# Patient Record
Sex: FEMALE | Race: WHITE | Employment: FULL TIME | URBAN - METROPOLITAN AREA
[De-identification: names, ages, dates, MRNs, and addresses within clinical notes are randomized per-mention and may not be internally consistent; named-entity substitution may affect disease eponyms.]

---

## 2019-11-26 ENCOUNTER — HOSPITAL ENCOUNTER (INPATIENT)
Age: 39
LOS: 4 days | Discharge: HOME | DRG: 270 | End: 2019-11-30
Attending: EMERGENCY MEDICINE | Admitting: INTERNAL MEDICINE
Payer: COMMERCIAL

## 2019-11-26 ENCOUNTER — APPOINTMENT (OUTPATIENT)
Dept: GENERAL RADIOLOGY | Age: 39
DRG: 270 | End: 2019-11-26
Payer: COMMERCIAL

## 2019-11-26 DIAGNOSIS — E87.6 HYPOKALEMIA: ICD-10-CM

## 2019-11-26 DIAGNOSIS — R77.8 ELEVATED TROPONIN: ICD-10-CM

## 2019-11-26 DIAGNOSIS — I21.3 ACUTE ST ELEVATION MYOCARDIAL INFARCTION (STEMI), UNSPECIFIED ARTERY (HCC): Primary | ICD-10-CM

## 2019-11-26 DIAGNOSIS — E83.42 HYPOMAGNESEMIA: ICD-10-CM

## 2019-11-26 LAB
A/G RATIO: 1.4 (ref 1.1–2.2)
ALBUMIN SERPL-MCNC: 4.1 G/DL (ref 3.4–5)
ALP BLD-CCNC: 68 U/L (ref 40–129)
ALT SERPL-CCNC: 8 U/L (ref 10–40)
ANION GAP SERPL CALCULATED.3IONS-SCNC: 16 MMOL/L (ref 3–16)
AST SERPL-CCNC: 11 U/L (ref 15–37)
BASOPHILS ABSOLUTE: 0.1 K/UL (ref 0–0.2)
BASOPHILS RELATIVE PERCENT: 0.8 %
BILIRUB SERPL-MCNC: <0.2 MG/DL (ref 0–1)
BUN BLDV-MCNC: 13 MG/DL (ref 7–20)
CALCIUM SERPL-MCNC: 8.8 MG/DL (ref 8.3–10.6)
CHLORIDE BLD-SCNC: 100 MMOL/L (ref 99–110)
CO2: 22 MMOL/L (ref 21–32)
CREAT SERPL-MCNC: 0.6 MG/DL (ref 0.6–1.1)
EOSINOPHILS ABSOLUTE: 0.1 K/UL (ref 0–0.6)
EOSINOPHILS RELATIVE PERCENT: 0.8 %
GFR AFRICAN AMERICAN: >60
GFR NON-AFRICAN AMERICAN: >60
GLOBULIN: 3 G/DL
GLUCOSE BLD-MCNC: 135 MG/DL (ref 70–99)
HCG QUALITATIVE: NEGATIVE
HCT VFR BLD CALC: 40.7 % (ref 36–48)
HEMOGLOBIN: 14 G/DL (ref 12–16)
INR BLD: 0.91 (ref 0.86–1.14)
LYMPHOCYTES ABSOLUTE: 4.3 K/UL (ref 1–5.1)
LYMPHOCYTES RELATIVE PERCENT: 39.2 %
MAGNESIUM: 1.7 MG/DL (ref 1.8–2.4)
MCH RBC QN AUTO: 31.3 PG (ref 26–34)
MCHC RBC AUTO-ENTMCNC: 34.4 G/DL (ref 31–36)
MCV RBC AUTO: 90.9 FL (ref 80–100)
MONOCYTES ABSOLUTE: 0.6 K/UL (ref 0–1.3)
MONOCYTES RELATIVE PERCENT: 5.3 %
NEUTROPHILS ABSOLUTE: 5.9 K/UL (ref 1.7–7.7)
NEUTROPHILS RELATIVE PERCENT: 53.9 %
PDW BLD-RTO: 13.1 % (ref 12.4–15.4)
PLATELET # BLD: 323 K/UL (ref 135–450)
PMV BLD AUTO: 7.9 FL (ref 5–10.5)
POTASSIUM REFLEX MAGNESIUM: 3.4 MMOL/L (ref 3.5–5.1)
PROTHROMBIN TIME: 10.6 SEC (ref 10–13.2)
RBC # BLD: 4.48 M/UL (ref 4–5.2)
SODIUM BLD-SCNC: 138 MMOL/L (ref 136–145)
SPECIMEN STATUS: NORMAL
TOTAL PROTEIN: 7.1 G/DL (ref 6.4–8.2)
TROPONIN: 0.02 NG/ML
WBC # BLD: 10.9 K/UL (ref 4–11)

## 2019-11-26 PROCEDURE — C1887 CATHETER, GUIDING: HCPCS

## 2019-11-26 PROCEDURE — 99292 CRITICAL CARE ADDL 30 MIN: CPT | Performed by: INTERNAL MEDICINE

## 2019-11-26 PROCEDURE — 84484 ASSAY OF TROPONIN QUANT: CPT

## 2019-11-26 PROCEDURE — C1725 CATH, TRANSLUMIN NON-LASER: HCPCS

## 2019-11-26 PROCEDURE — 2500000003 HC RX 250 WO HCPCS

## 2019-11-26 PROCEDURE — 94761 N-INVAS EAR/PLS OXIMETRY MLT: CPT

## 2019-11-26 PROCEDURE — 93458 L HRT ARTERY/VENTRICLE ANGIO: CPT | Performed by: INTERNAL MEDICINE

## 2019-11-26 PROCEDURE — B2111ZZ FLUOROSCOPY OF MULTIPLE CORONARY ARTERIES USING LOW OSMOLAR CONTRAST: ICD-10-PCS | Performed by: INTERNAL MEDICINE

## 2019-11-26 PROCEDURE — 99291 CRITICAL CARE FIRST HOUR: CPT

## 2019-11-26 PROCEDURE — B2151ZZ FLUOROSCOPY OF LEFT HEART USING LOW OSMOLAR CONTRAST: ICD-10-PCS | Performed by: INTERNAL MEDICINE

## 2019-11-26 PROCEDURE — 71045 X-RAY EXAM CHEST 1 VIEW: CPT

## 2019-11-26 PROCEDURE — 2700000000 HC OXYGEN THERAPY PER DAY

## 2019-11-26 PROCEDURE — 83735 ASSAY OF MAGNESIUM: CPT

## 2019-11-26 PROCEDURE — 2709999900 HC NON-CHARGEABLE SUPPLY

## 2019-11-26 PROCEDURE — 6360000002 HC RX W HCPCS

## 2019-11-26 PROCEDURE — 33967 INSERT I-AORT PERCUT DEVICE: CPT | Performed by: INTERNAL MEDICINE

## 2019-11-26 PROCEDURE — 93005 ELECTROCARDIOGRAM TRACING: CPT | Performed by: EMERGENCY MEDICINE

## 2019-11-26 PROCEDURE — 96374 THER/PROPH/DIAG INJ IV PUSH: CPT

## 2019-11-26 PROCEDURE — 85347 COAGULATION TIME ACTIVATED: CPT

## 2019-11-26 PROCEDURE — 6370000000 HC RX 637 (ALT 250 FOR IP): Performed by: EMERGENCY MEDICINE

## 2019-11-26 PROCEDURE — C1894 INTRO/SHEATH, NON-LASER: HCPCS

## 2019-11-26 PROCEDURE — 85025 COMPLETE CBC W/AUTO DIFF WBC: CPT

## 2019-11-26 PROCEDURE — 2000000000 HC ICU R&B

## 2019-11-26 PROCEDURE — C1769 GUIDE WIRE: HCPCS

## 2019-11-26 PROCEDURE — 80053 COMPREHEN METABOLIC PANEL: CPT

## 2019-11-26 PROCEDURE — 2580000003 HC RX 258

## 2019-11-26 PROCEDURE — 96375 TX/PRO/DX INJ NEW DRUG ADDON: CPT

## 2019-11-26 PROCEDURE — 6360000002 HC RX W HCPCS: Performed by: EMERGENCY MEDICINE

## 2019-11-26 PROCEDURE — 85610 PROTHROMBIN TIME: CPT

## 2019-11-26 PROCEDURE — 5A02110 ASSISTANCE WITH CARDIAC OUTPUT USING BALLOON PUMP, INTERMITTENT: ICD-10-PCS | Performed by: INTERNAL MEDICINE

## 2019-11-26 PROCEDURE — 99291 CRITICAL CARE FIRST HOUR: CPT | Performed by: INTERNAL MEDICINE

## 2019-11-26 PROCEDURE — 84703 CHORIONIC GONADOTROPIN ASSAY: CPT

## 2019-11-26 PROCEDURE — 6360000004 HC RX CONTRAST MEDICATION

## 2019-11-26 PROCEDURE — 4A023N7 MEASUREMENT OF CARDIAC SAMPLING AND PRESSURE, LEFT HEART, PERCUTANEOUS APPROACH: ICD-10-PCS | Performed by: INTERNAL MEDICINE

## 2019-11-26 RX ORDER — HEPARIN SODIUM 1000 [USP'U]/ML
60 INJECTION, SOLUTION INTRAVENOUS; SUBCUTANEOUS PRN
Status: DISCONTINUED | OUTPATIENT
Start: 2019-11-26 | End: 2019-11-27 | Stop reason: ALTCHOICE

## 2019-11-26 RX ORDER — ONDANSETRON 2 MG/ML
4 INJECTION INTRAMUSCULAR; INTRAVENOUS EVERY 6 HOURS PRN
Status: DISCONTINUED | OUTPATIENT
Start: 2019-11-26 | End: 2019-11-30 | Stop reason: HOSPADM

## 2019-11-26 RX ORDER — HEPARIN SODIUM 1000 [USP'U]/ML
30 INJECTION, SOLUTION INTRAVENOUS; SUBCUTANEOUS PRN
Status: DISCONTINUED | OUTPATIENT
Start: 2019-11-26 | End: 2019-11-27 | Stop reason: ALTCHOICE

## 2019-11-26 RX ORDER — FENTANYL CITRATE 50 UG/ML
INJECTION, SOLUTION INTRAMUSCULAR; INTRAVENOUS
Status: COMPLETED | OUTPATIENT
Start: 2019-11-26 | End: 2019-11-26

## 2019-11-26 RX ORDER — HEPARIN SODIUM 1000 [USP'U]/ML
INJECTION, SOLUTION INTRAVENOUS; SUBCUTANEOUS
Status: COMPLETED
Start: 2019-11-26 | End: 2019-11-26

## 2019-11-26 RX ORDER — HEPARIN SODIUM 1000 [USP'U]/ML
60 INJECTION, SOLUTION INTRAVENOUS; SUBCUTANEOUS ONCE
Status: COMPLETED | OUTPATIENT
Start: 2019-11-26 | End: 2019-11-26

## 2019-11-26 RX ORDER — ATORVASTATIN CALCIUM 40 MG/1
40 TABLET, FILM COATED ORAL NIGHTLY
Status: DISCONTINUED | OUTPATIENT
Start: 2019-11-27 | End: 2019-11-30 | Stop reason: HOSPADM

## 2019-11-26 RX ORDER — ASPIRIN 81 MG/1
81 TABLET, CHEWABLE ORAL DAILY
Status: DISCONTINUED | OUTPATIENT
Start: 2019-11-27 | End: 2019-11-30 | Stop reason: HOSPADM

## 2019-11-26 RX ORDER — HEPARIN SODIUM 1000 [USP'U]/ML
60 INJECTION, SOLUTION INTRAVENOUS; SUBCUTANEOUS ONCE
Status: DISCONTINUED | OUTPATIENT
Start: 2019-11-27 | End: 2019-11-27 | Stop reason: SDUPTHER

## 2019-11-26 RX ORDER — FENTANYL CITRATE 50 UG/ML
25 INJECTION, SOLUTION INTRAMUSCULAR; INTRAVENOUS ONCE
Status: COMPLETED | OUTPATIENT
Start: 2019-11-26 | End: 2019-11-26

## 2019-11-26 RX ORDER — MAGNESIUM SULFATE 1 G/100ML
1 INJECTION INTRAVENOUS ONCE
Status: COMPLETED | OUTPATIENT
Start: 2019-11-26 | End: 2019-11-27

## 2019-11-26 RX ORDER — MIDAZOLAM HYDROCHLORIDE 5 MG/ML
INJECTION INTRAMUSCULAR; INTRAVENOUS
Status: COMPLETED | OUTPATIENT
Start: 2019-11-26 | End: 2019-11-26

## 2019-11-26 RX ORDER — HEPARIN SODIUM 10000 [USP'U]/100ML
12 INJECTION, SOLUTION INTRAVENOUS CONTINUOUS
Status: DISCONTINUED | OUTPATIENT
Start: 2019-11-27 | End: 2019-11-27

## 2019-11-26 RX ORDER — NITROGLYCERIN 0.4 MG/1
0.4 TABLET SUBLINGUAL EVERY 5 MIN PRN
Status: DISCONTINUED | OUTPATIENT
Start: 2019-11-26 | End: 2019-11-27 | Stop reason: SDUPTHER

## 2019-11-26 RX ORDER — HEPARIN SODIUM 1000 [USP'U]/ML
INJECTION, SOLUTION INTRAVENOUS; SUBCUTANEOUS
Status: COMPLETED | OUTPATIENT
Start: 2019-11-26 | End: 2019-11-26

## 2019-11-26 RX ADMIN — FENTANYL CITRATE 25 MCG: 50 INJECTION, SOLUTION INTRAMUSCULAR; INTRAVENOUS at 22:41

## 2019-11-26 RX ADMIN — FENTANYL CITRATE 50 MCG: 50 INJECTION, SOLUTION INTRAMUSCULAR; INTRAVENOUS at 23:30

## 2019-11-26 RX ADMIN — TICAGRELOR 180 MG: 90 TABLET ORAL at 22:29

## 2019-11-26 RX ADMIN — HEPARIN SODIUM 3000 UNITS: 1000 INJECTION, SOLUTION INTRAVENOUS; SUBCUTANEOUS at 23:11

## 2019-11-26 RX ADMIN — FENTANYL CITRATE 50 MCG: 50 INJECTION, SOLUTION INTRAMUSCULAR; INTRAVENOUS at 23:00

## 2019-11-26 RX ADMIN — HEPARIN SODIUM 3810 UNITS: 1000 INJECTION INTRAVENOUS; SUBCUTANEOUS at 22:33

## 2019-11-26 RX ADMIN — MIDAZOLAM HYDROCHLORIDE 2 MG: 5 INJECTION INTRAMUSCULAR; INTRAVENOUS at 23:00

## 2019-11-26 RX ADMIN — HEPARIN SODIUM 3810 UNITS: 1000 INJECTION, SOLUTION INTRAVENOUS; SUBCUTANEOUS at 22:33

## 2019-11-26 ASSESSMENT — PAIN SCALES - GENERAL
PAINLEVEL_OUTOF10: 3
PAINLEVEL_OUTOF10: 3

## 2019-11-26 ASSESSMENT — PAIN DESCRIPTION - PAIN TYPE: TYPE: ACUTE PAIN

## 2019-11-26 ASSESSMENT — PAIN DESCRIPTION - LOCATION: LOCATION: CHEST

## 2019-11-27 ENCOUNTER — APPOINTMENT (OUTPATIENT)
Dept: GENERAL RADIOLOGY | Age: 39
DRG: 270 | End: 2019-11-27
Payer: COMMERCIAL

## 2019-11-27 PROBLEM — I21.3 STEMI (ST ELEVATION MYOCARDIAL INFARCTION) (HCC): Status: RESOLVED | Noted: 2019-11-27 | Resolved: 2019-11-27

## 2019-11-27 PROBLEM — I21.3 STEMI (ST ELEVATION MYOCARDIAL INFARCTION) (HCC): Status: ACTIVE | Noted: 2019-11-27

## 2019-11-27 LAB
A/G RATIO: 1.1 (ref 1.1–2.2)
A/G RATIO: 1.2 (ref 1.1–2.2)
ALBUMIN SERPL-MCNC: 3.5 G/DL (ref 3.4–5)
ALBUMIN SERPL-MCNC: 3.7 G/DL (ref 3.4–5)
ALP BLD-CCNC: 64 U/L (ref 40–129)
ALP BLD-CCNC: 65 U/L (ref 40–129)
ALT SERPL-CCNC: 20 U/L (ref 10–40)
ALT SERPL-CCNC: 24 U/L (ref 10–40)
ANION GAP SERPL CALCULATED.3IONS-SCNC: 11 MMOL/L (ref 3–16)
ANION GAP SERPL CALCULATED.3IONS-SCNC: 11 MMOL/L (ref 3–16)
APTT: 39.8 SEC (ref 24.2–36.2)
APTT: 41.1 SEC (ref 24.2–36.2)
AST SERPL-CCNC: 143 U/L (ref 15–37)
AST SERPL-CCNC: 182 U/L (ref 15–37)
BASOPHILS ABSOLUTE: 0 K/UL (ref 0–0.2)
BASOPHILS RELATIVE PERCENT: 0.6 %
BILIRUB SERPL-MCNC: 0.3 MG/DL (ref 0–1)
BILIRUB SERPL-MCNC: 0.3 MG/DL (ref 0–1)
BUN BLDV-MCNC: 10 MG/DL (ref 7–20)
BUN BLDV-MCNC: 11 MG/DL (ref 7–20)
C-REACTIVE PROTEIN: 14 MG/L (ref 0–5.1)
CALCIUM SERPL-MCNC: 8.3 MG/DL (ref 8.3–10.6)
CALCIUM SERPL-MCNC: 8.3 MG/DL (ref 8.3–10.6)
CHLORIDE BLD-SCNC: 103 MMOL/L (ref 99–110)
CHLORIDE BLD-SCNC: 104 MMOL/L (ref 99–110)
CHOLESTEROL, TOTAL: 159 MG/DL (ref 0–199)
CO2: 20 MMOL/L (ref 21–32)
CO2: 20 MMOL/L (ref 21–32)
CREAT SERPL-MCNC: <0.5 MG/DL (ref 0.6–1.1)
CREAT SERPL-MCNC: <0.5 MG/DL (ref 0.6–1.1)
EKG ATRIAL RATE: 73 BPM
EKG ATRIAL RATE: 80 BPM
EKG DIAGNOSIS: NORMAL
EKG DIAGNOSIS: NORMAL
EKG P AXIS: -8 DEGREES
EKG P AXIS: 69 DEGREES
EKG P-R INTERVAL: 152 MS
EKG P-R INTERVAL: 160 MS
EKG Q-T INTERVAL: 382 MS
EKG Q-T INTERVAL: 392 MS
EKG QRS DURATION: 88 MS
EKG QRS DURATION: 90 MS
EKG QTC CALCULATION (BAZETT): 431 MS
EKG QTC CALCULATION (BAZETT): 440 MS
EKG R AXIS: 0 DEGREES
EKG R AXIS: 6 DEGREES
EKG T AXIS: 55 DEGREES
EKG T AXIS: 72 DEGREES
EKG VENTRICULAR RATE: 73 BPM
EKG VENTRICULAR RATE: 80 BPM
EOSINOPHILS ABSOLUTE: 0 K/UL (ref 0–0.6)
EOSINOPHILS RELATIVE PERCENT: 0.1 %
GFR AFRICAN AMERICAN: >60
GFR AFRICAN AMERICAN: >60
GFR NON-AFRICAN AMERICAN: >60
GFR NON-AFRICAN AMERICAN: >60
GLOBULIN: 3 G/DL
GLOBULIN: 3.1 G/DL
GLUCOSE BLD-MCNC: 102 MG/DL (ref 70–99)
GLUCOSE BLD-MCNC: 143 MG/DL (ref 70–99)
HCT VFR BLD CALC: 38 % (ref 36–48)
HCT VFR BLD CALC: 38.1 % (ref 36–48)
HDLC SERPL-MCNC: 50 MG/DL (ref 40–60)
HEMOGLOBIN: 12.9 G/DL (ref 12–16)
HEMOGLOBIN: 13 G/DL (ref 12–16)
LDL CHOLESTEROL CALCULATED: 94 MG/DL
LV EF: 38 %
LVEF MODALITY: NORMAL
LYMPHOCYTES ABSOLUTE: 1.1 K/UL (ref 1–5.1)
LYMPHOCYTES RELATIVE PERCENT: 13.7 %
MCH RBC QN AUTO: 31.1 PG (ref 26–34)
MCH RBC QN AUTO: 31.3 PG (ref 26–34)
MCHC RBC AUTO-ENTMCNC: 34.1 G/DL (ref 31–36)
MCHC RBC AUTO-ENTMCNC: 34.1 G/DL (ref 31–36)
MCV RBC AUTO: 91 FL (ref 80–100)
MCV RBC AUTO: 91.8 FL (ref 80–100)
MONOCYTES ABSOLUTE: 0.5 K/UL (ref 0–1.3)
MONOCYTES RELATIVE PERCENT: 6.5 %
NEUTROPHILS ABSOLUTE: 6.4 K/UL (ref 1.7–7.7)
NEUTROPHILS RELATIVE PERCENT: 79.1 %
PDW BLD-RTO: 13 % (ref 12.4–15.4)
PDW BLD-RTO: 13.2 % (ref 12.4–15.4)
PLATELET # BLD: 281 K/UL (ref 135–450)
PLATELET # BLD: 281 K/UL (ref 135–450)
PMV BLD AUTO: 8 FL (ref 5–10.5)
PMV BLD AUTO: 8.1 FL (ref 5–10.5)
POC ACT LR: 156 SEC
POC ACT LR: 353 SEC
POTASSIUM REFLEX MAGNESIUM: 3.8 MMOL/L (ref 3.5–5.1)
POTASSIUM REFLEX MAGNESIUM: 3.9 MMOL/L (ref 3.5–5.1)
RBC # BLD: 4.14 M/UL (ref 4–5.2)
RBC # BLD: 4.19 M/UL (ref 4–5.2)
REASON FOR REJECTION: NORMAL
REJECTED TEST: NORMAL
SEDIMENTATION RATE, ERYTHROCYTE: 17 MM/HR (ref 0–20)
SODIUM BLD-SCNC: 134 MMOL/L (ref 136–145)
SODIUM BLD-SCNC: 135 MMOL/L (ref 136–145)
TOTAL PROTEIN: 6.6 G/DL (ref 6.4–8.2)
TOTAL PROTEIN: 6.7 G/DL (ref 6.4–8.2)
TRIGL SERPL-MCNC: 76 MG/DL (ref 0–150)
TROPONIN: 4.41 NG/ML
TROPONIN: 5.1 NG/ML
VLDLC SERPL CALC-MCNC: 15 MG/DL
WBC # BLD: 10.6 K/UL (ref 4–11)
WBC # BLD: 8.1 K/UL (ref 4–11)

## 2019-11-27 PROCEDURE — C8929 TTE W OR WO FOL WCON,DOPPLER: HCPCS

## 2019-11-27 PROCEDURE — 85652 RBC SED RATE AUTOMATED: CPT

## 2019-11-27 PROCEDURE — 86255 FLUORESCENT ANTIBODY SCREEN: CPT

## 2019-11-27 PROCEDURE — 6370000000 HC RX 637 (ALT 250 FOR IP)

## 2019-11-27 PROCEDURE — 93010 ELECTROCARDIOGRAM REPORT: CPT | Performed by: INTERNAL MEDICINE

## 2019-11-27 PROCEDURE — 2700000000 HC OXYGEN THERAPY PER DAY

## 2019-11-27 PROCEDURE — 94761 N-INVAS EAR/PLS OXIMETRY MLT: CPT

## 2019-11-27 PROCEDURE — 6360000002 HC RX W HCPCS: Performed by: INTERNAL MEDICINE

## 2019-11-27 PROCEDURE — 80061 LIPID PANEL: CPT

## 2019-11-27 PROCEDURE — 84484 ASSAY OF TROPONIN QUANT: CPT

## 2019-11-27 PROCEDURE — 6370000000 HC RX 637 (ALT 250 FOR IP): Performed by: INTERNAL MEDICINE

## 2019-11-27 PROCEDURE — 93971 EXTREMITY STUDY: CPT | Performed by: INTERNAL MEDICINE

## 2019-11-27 PROCEDURE — 80053 COMPREHEN METABOLIC PANEL: CPT

## 2019-11-27 PROCEDURE — 86140 C-REACTIVE PROTEIN: CPT

## 2019-11-27 PROCEDURE — 71045 X-RAY EXAM CHEST 1 VIEW: CPT

## 2019-11-27 PROCEDURE — 6360000002 HC RX W HCPCS: Performed by: EMERGENCY MEDICINE

## 2019-11-27 PROCEDURE — 36415 COLL VENOUS BLD VENIPUNCTURE: CPT

## 2019-11-27 PROCEDURE — 85027 COMPLETE CBC AUTOMATED: CPT

## 2019-11-27 PROCEDURE — 93458 L HRT ARTERY/VENTRICLE ANGIO: CPT

## 2019-11-27 PROCEDURE — 6360000002 HC RX W HCPCS

## 2019-11-27 PROCEDURE — 93005 ELECTROCARDIOGRAM TRACING: CPT | Performed by: INTERNAL MEDICINE

## 2019-11-27 PROCEDURE — 85347 COAGULATION TIME ACTIVATED: CPT

## 2019-11-27 PROCEDURE — 99222 1ST HOSP IP/OBS MODERATE 55: CPT | Performed by: INTERNAL MEDICINE

## 2019-11-27 PROCEDURE — 2580000003 HC RX 258: Performed by: INTERNAL MEDICINE

## 2019-11-27 PROCEDURE — 33967 INSERT I-AORT PERCUT DEVICE: CPT

## 2019-11-27 PROCEDURE — 99223 1ST HOSP IP/OBS HIGH 75: CPT | Performed by: THORACIC SURGERY (CARDIOTHORACIC VASCULAR SURGERY)

## 2019-11-27 PROCEDURE — 86038 ANTINUCLEAR ANTIBODIES: CPT

## 2019-11-27 PROCEDURE — 2000000000 HC ICU R&B

## 2019-11-27 PROCEDURE — 99291 CRITICAL CARE FIRST HOUR: CPT | Performed by: INTERNAL MEDICINE

## 2019-11-27 PROCEDURE — 85025 COMPLETE CBC W/AUTO DIFF WBC: CPT

## 2019-11-27 PROCEDURE — 2580000003 HC RX 258

## 2019-11-27 PROCEDURE — 85730 THROMBOPLASTIN TIME PARTIAL: CPT

## 2019-11-27 RX ORDER — MIDAZOLAM HYDROCHLORIDE 1 MG/ML
INJECTION INTRAMUSCULAR; INTRAVENOUS
Status: DISPENSED
Start: 2019-11-27 | End: 2019-11-28

## 2019-11-27 RX ORDER — MIDAZOLAM HYDROCHLORIDE 1 MG/ML
2 INJECTION INTRAMUSCULAR; INTRAVENOUS ONCE
Status: COMPLETED | OUTPATIENT
Start: 2019-11-27 | End: 2019-11-27

## 2019-11-27 RX ORDER — SODIUM CHLORIDE 9 MG/ML
INJECTION, SOLUTION INTRAVENOUS
Status: COMPLETED
Start: 2019-11-27 | End: 2019-11-27

## 2019-11-27 RX ORDER — MORPHINE SULFATE 4 MG/ML
4 INJECTION, SOLUTION INTRAMUSCULAR; INTRAVENOUS EVERY 4 HOURS PRN
Status: DISCONTINUED | OUTPATIENT
Start: 2019-11-27 | End: 2019-11-30 | Stop reason: HOSPADM

## 2019-11-27 RX ORDER — ONDANSETRON 2 MG/ML
4 INJECTION INTRAMUSCULAR; INTRAVENOUS EVERY 6 HOURS PRN
Status: DISCONTINUED | OUTPATIENT
Start: 2019-11-27 | End: 2019-11-30 | Stop reason: HOSPADM

## 2019-11-27 RX ORDER — SODIUM CHLORIDE 0.9 % (FLUSH) 0.9 %
10 SYRINGE (ML) INJECTION EVERY 12 HOURS SCHEDULED
Status: DISCONTINUED | OUTPATIENT
Start: 2019-11-27 | End: 2019-11-30 | Stop reason: HOSPADM

## 2019-11-27 RX ORDER — SODIUM CHLORIDE 0.9 % (FLUSH) 0.9 %
10 SYRINGE (ML) INJECTION EVERY 12 HOURS SCHEDULED
Status: DISCONTINUED | OUTPATIENT
Start: 2019-11-27 | End: 2019-11-27 | Stop reason: SDUPTHER

## 2019-11-27 RX ORDER — FAMOTIDINE 20 MG/1
20 TABLET, FILM COATED ORAL 2 TIMES DAILY
Status: DISCONTINUED | OUTPATIENT
Start: 2019-11-27 | End: 2019-11-30 | Stop reason: HOSPADM

## 2019-11-27 RX ORDER — MORPHINE SULFATE 4 MG/ML
2 INJECTION, SOLUTION INTRAMUSCULAR; INTRAVENOUS
Status: COMPLETED | OUTPATIENT
Start: 2019-11-27 | End: 2019-11-27

## 2019-11-27 RX ORDER — ACETAMINOPHEN 325 MG/1
650 TABLET ORAL EVERY 4 HOURS PRN
Status: DISCONTINUED | OUTPATIENT
Start: 2019-11-27 | End: 2019-11-30 | Stop reason: HOSPADM

## 2019-11-27 RX ORDER — FENTANYL CITRATE 50 UG/ML
25 INJECTION, SOLUTION INTRAMUSCULAR; INTRAVENOUS ONCE
Status: COMPLETED | OUTPATIENT
Start: 2019-11-27 | End: 2019-11-27

## 2019-11-27 RX ORDER — HEPARIN SODIUM 10000 [USP'U]/100ML
8.9 INJECTION, SOLUTION INTRAVENOUS CONTINUOUS
Status: DISCONTINUED | OUTPATIENT
Start: 2019-11-27 | End: 2019-11-27 | Stop reason: ALTCHOICE

## 2019-11-27 RX ORDER — SODIUM CHLORIDE 0.9 % (FLUSH) 0.9 %
10 SYRINGE (ML) INJECTION PRN
Status: DISCONTINUED | OUTPATIENT
Start: 2019-11-27 | End: 2019-11-27 | Stop reason: SDUPTHER

## 2019-11-27 RX ORDER — SODIUM CHLORIDE 9 MG/ML
INJECTION, SOLUTION INTRAVENOUS ONCE
Status: COMPLETED | OUTPATIENT
Start: 2019-11-27 | End: 2019-11-27

## 2019-11-27 RX ORDER — LORAZEPAM 2 MG/ML
1 INJECTION INTRAMUSCULAR EVERY 4 HOURS PRN
Status: DISCONTINUED | OUTPATIENT
Start: 2019-11-27 | End: 2019-11-30 | Stop reason: HOSPADM

## 2019-11-27 RX ORDER — FENTANYL CITRATE 50 UG/ML
INJECTION, SOLUTION INTRAMUSCULAR; INTRAVENOUS
Status: COMPLETED
Start: 2019-11-27 | End: 2019-11-27

## 2019-11-27 RX ORDER — MIDAZOLAM HYDROCHLORIDE 5 MG/ML
INJECTION INTRAMUSCULAR; INTRAVENOUS
Status: COMPLETED
Start: 2019-11-27 | End: 2019-11-27

## 2019-11-27 RX ORDER — MIDAZOLAM HYDROCHLORIDE 1 MG/ML
5 INJECTION INTRAMUSCULAR; INTRAVENOUS ONCE
Status: COMPLETED | OUTPATIENT
Start: 2019-11-27 | End: 2019-11-27

## 2019-11-27 RX ORDER — SODIUM CHLORIDE 0.9 % (FLUSH) 0.9 %
10 SYRINGE (ML) INJECTION PRN
Status: DISCONTINUED | OUTPATIENT
Start: 2019-11-27 | End: 2019-11-30 | Stop reason: HOSPADM

## 2019-11-27 RX ADMIN — FENTANYL CITRATE 25 MCG: 50 INJECTION, SOLUTION INTRAMUSCULAR; INTRAVENOUS at 14:28

## 2019-11-27 RX ADMIN — MORPHINE SULFATE 2 MG: 4 INJECTION, SOLUTION INTRAMUSCULAR; INTRAVENOUS at 03:50

## 2019-11-27 RX ADMIN — HEPARIN SODIUM 12 UNITS/KG/HR: 10000 INJECTION, SOLUTION INTRAVENOUS at 01:25

## 2019-11-27 RX ADMIN — MIDAZOLAM HYDROCHLORIDE 2 MG: 5 INJECTION, SOLUTION INTRAMUSCULAR; INTRAVENOUS at 13:51

## 2019-11-27 RX ADMIN — MORPHINE SULFATE 2 MG: 4 INJECTION, SOLUTION INTRAMUSCULAR; INTRAVENOUS at 15:37

## 2019-11-27 RX ADMIN — METOPROLOL TARTRATE 12.5 MG: 25 TABLET, FILM COATED ORAL at 09:02

## 2019-11-27 RX ADMIN — FENTANYL CITRATE 50 MCG: 50 INJECTION, SOLUTION INTRAMUSCULAR; INTRAVENOUS at 13:51

## 2019-11-27 RX ADMIN — ATORVASTATIN CALCIUM 40 MG: 40 TABLET, FILM COATED ORAL at 01:24

## 2019-11-27 RX ADMIN — HEPARIN SODIUM 1910 UNITS: 1000 INJECTION INTRAVENOUS; SUBCUTANEOUS at 08:00

## 2019-11-27 RX ADMIN — SODIUM CHLORIDE 1000 ML: 9 INJECTION, SOLUTION INTRAVENOUS at 10:50

## 2019-11-27 RX ADMIN — MAGNESIUM SULFATE HEPTAHYDRATE 1 G: 1 INJECTION, SOLUTION INTRAVENOUS at 01:37

## 2019-11-27 RX ADMIN — MORPHINE SULFATE 2 MG: 4 INJECTION, SOLUTION INTRAMUSCULAR; INTRAVENOUS at 11:44

## 2019-11-27 RX ADMIN — MIDAZOLAM 2 MG: 1 INJECTION INTRAMUSCULAR; INTRAVENOUS at 13:39

## 2019-11-27 RX ADMIN — MORPHINE SULFATE 2 MG: 4 INJECTION, SOLUTION INTRAMUSCULAR; INTRAVENOUS at 06:26

## 2019-11-27 RX ADMIN — LORAZEPAM 1 MG: 2 INJECTION, SOLUTION INTRAMUSCULAR; INTRAVENOUS at 01:24

## 2019-11-27 RX ADMIN — ASPIRIN 81 MG 81 MG: 81 TABLET ORAL at 09:01

## 2019-11-27 RX ADMIN — Medication 12.5 MG: at 09:02

## 2019-11-27 RX ADMIN — LORAZEPAM 1 MG: 2 INJECTION, SOLUTION INTRAMUSCULAR; INTRAVENOUS at 15:37

## 2019-11-27 RX ADMIN — ATORVASTATIN CALCIUM 40 MG: 40 TABLET, FILM COATED ORAL at 20:57

## 2019-11-27 RX ADMIN — MAGNESIUM SULFATE HEPTAHYDRATE 3 G: 500 INJECTION, SOLUTION INTRAMUSCULAR; INTRAVENOUS at 09:12

## 2019-11-27 RX ADMIN — MORPHINE SULFATE 4 MG: 4 INJECTION, SOLUTION INTRAMUSCULAR; INTRAVENOUS at 21:02

## 2019-11-27 RX ADMIN — FAMOTIDINE 20 MG: 20 TABLET ORAL at 20:57

## 2019-11-27 RX ADMIN — FENTANYL CITRATE 25 MCG: 50 INJECTION INTRAMUSCULAR; INTRAVENOUS at 13:43

## 2019-11-27 RX ADMIN — MIDAZOLAM HYDROCHLORIDE 1 MG: 2 INJECTION, SOLUTION INTRAMUSCULAR; INTRAVENOUS at 14:27

## 2019-11-27 ASSESSMENT — PAIN SCALES - GENERAL
PAINLEVEL_OUTOF10: 9
PAINLEVEL_OUTOF10: 0
PAINLEVEL_OUTOF10: 9
PAINLEVEL_OUTOF10: 6
PAINLEVEL_OUTOF10: 3
PAINLEVEL_OUTOF10: 5
PAINLEVEL_OUTOF10: 9
PAINLEVEL_OUTOF10: 0
PAINLEVEL_OUTOF10: 0

## 2019-11-27 ASSESSMENT — PAIN DESCRIPTION - ONSET
ONSET: ON-GOING
ONSET: ON-GOING

## 2019-11-27 ASSESSMENT — PAIN DESCRIPTION - PROGRESSION
CLINICAL_PROGRESSION: GRADUALLY WORSENING

## 2019-11-27 ASSESSMENT — PAIN DESCRIPTION - LOCATION
LOCATION: BACK
LOCATION: BACK
LOCATION: BACK;GROIN

## 2019-11-27 ASSESSMENT — PAIN DESCRIPTION - DESCRIPTORS
DESCRIPTORS: ACHING;DISCOMFORT
DESCRIPTORS: ACHING
DESCRIPTORS: ACHING;CONSTANT

## 2019-11-27 ASSESSMENT — PAIN DESCRIPTION - PAIN TYPE
TYPE: ACUTE PAIN
TYPE: ACUTE PAIN;SURGICAL PAIN
TYPE: ACUTE PAIN

## 2019-11-27 ASSESSMENT — PAIN DESCRIPTION - FREQUENCY
FREQUENCY: CONTINUOUS
FREQUENCY: CONTINUOUS
FREQUENCY: INTERMITTENT

## 2019-11-27 ASSESSMENT — PAIN DESCRIPTION - ORIENTATION
ORIENTATION: RIGHT;LOWER
ORIENTATION: LOWER;MID
ORIENTATION: LOWER

## 2019-11-28 LAB
A/G RATIO: 1.1 (ref 1.1–2.2)
ALBUMIN SERPL-MCNC: 3.3 G/DL (ref 3.4–5)
ALP BLD-CCNC: 64 U/L (ref 40–129)
ALT SERPL-CCNC: 20 U/L (ref 10–40)
ANION GAP SERPL CALCULATED.3IONS-SCNC: 12 MMOL/L (ref 3–16)
AST SERPL-CCNC: 81 U/L (ref 15–37)
BASOPHILS ABSOLUTE: 0.1 K/UL (ref 0–0.2)
BASOPHILS RELATIVE PERCENT: 0.6 %
BILIRUB SERPL-MCNC: 0.3 MG/DL (ref 0–1)
BUN BLDV-MCNC: 8 MG/DL (ref 7–20)
CALCIUM SERPL-MCNC: 7.9 MG/DL (ref 8.3–10.6)
CHLORIDE BLD-SCNC: 103 MMOL/L (ref 99–110)
CO2: 20 MMOL/L (ref 21–32)
CREAT SERPL-MCNC: <0.5 MG/DL (ref 0.6–1.1)
EKG ATRIAL RATE: 101 BPM
EKG DIAGNOSIS: NORMAL
EKG P AXIS: 53 DEGREES
EKG P-R INTERVAL: 152 MS
EKG Q-T INTERVAL: 364 MS
EKG QRS DURATION: 78 MS
EKG QTC CALCULATION (BAZETT): 471 MS
EKG R AXIS: -9 DEGREES
EKG T AXIS: 38 DEGREES
EKG VENTRICULAR RATE: 101 BPM
EOSINOPHILS ABSOLUTE: 0 K/UL (ref 0–0.6)
EOSINOPHILS RELATIVE PERCENT: 0.4 %
GFR AFRICAN AMERICAN: >60
GFR NON-AFRICAN AMERICAN: >60
GLOBULIN: 3.1 G/DL
GLUCOSE BLD-MCNC: 93 MG/DL (ref 70–99)
HCT VFR BLD CALC: 39.1 % (ref 36–48)
HEMOGLOBIN: 13.3 G/DL (ref 12–16)
LYMPHOCYTES ABSOLUTE: 2.5 K/UL (ref 1–5.1)
LYMPHOCYTES RELATIVE PERCENT: 26.1 %
MCH RBC QN AUTO: 31.5 PG (ref 26–34)
MCHC RBC AUTO-ENTMCNC: 33.9 G/DL (ref 31–36)
MCV RBC AUTO: 92.8 FL (ref 80–100)
MONOCYTES ABSOLUTE: 0.8 K/UL (ref 0–1.3)
MONOCYTES RELATIVE PERCENT: 8.1 %
NEUTROPHILS ABSOLUTE: 6.3 K/UL (ref 1.7–7.7)
NEUTROPHILS RELATIVE PERCENT: 64.8 %
PDW BLD-RTO: 13.1 % (ref 12.4–15.4)
PLATELET # BLD: 250 K/UL (ref 135–450)
PLATELET SLIDE REVIEW: ADEQUATE
PMV BLD AUTO: 7.9 FL (ref 5–10.5)
POC ACT LR: 137 SEC
POTASSIUM SERPL-SCNC: 4.4 MMOL/L (ref 3.5–5.1)
RBC # BLD: 4.21 M/UL (ref 4–5.2)
SLIDE REVIEW: NORMAL
SODIUM BLD-SCNC: 135 MMOL/L (ref 136–145)
TOTAL PROTEIN: 6.4 G/DL (ref 6.4–8.2)
WBC # BLD: 9.7 K/UL (ref 4–11)

## 2019-11-28 PROCEDURE — 6370000000 HC RX 637 (ALT 250 FOR IP): Performed by: INTERNAL MEDICINE

## 2019-11-28 PROCEDURE — 85025 COMPLETE CBC W/AUTO DIFF WBC: CPT

## 2019-11-28 PROCEDURE — 2580000003 HC RX 258: Performed by: INTERNAL MEDICINE

## 2019-11-28 PROCEDURE — 2060000000 HC ICU INTERMEDIATE R&B

## 2019-11-28 PROCEDURE — 80053 COMPREHEN METABOLIC PANEL: CPT

## 2019-11-28 PROCEDURE — 36415 COLL VENOUS BLD VENIPUNCTURE: CPT

## 2019-11-28 PROCEDURE — 93005 ELECTROCARDIOGRAM TRACING: CPT | Performed by: INTERNAL MEDICINE

## 2019-11-28 PROCEDURE — 99233 SBSQ HOSP IP/OBS HIGH 50: CPT | Performed by: INTERNAL MEDICINE

## 2019-11-28 PROCEDURE — 93010 ELECTROCARDIOGRAM REPORT: CPT | Performed by: INTERNAL MEDICINE

## 2019-11-28 RX ORDER — CARVEDILOL 3.12 MG/1
3.12 TABLET ORAL 2 TIMES DAILY WITH MEALS
Status: DISCONTINUED | OUTPATIENT
Start: 2019-11-28 | End: 2019-11-30 | Stop reason: HOSPADM

## 2019-11-28 RX ORDER — LISINOPRIL 2.5 MG/1
2.5 TABLET ORAL DAILY
Status: DISCONTINUED | OUTPATIENT
Start: 2019-11-28 | End: 2019-11-30 | Stop reason: HOSPADM

## 2019-11-28 RX ADMIN — FAMOTIDINE 20 MG: 20 TABLET ORAL at 21:34

## 2019-11-28 RX ADMIN — MUPIROCIN: 20 OINTMENT TOPICAL at 21:34

## 2019-11-28 RX ADMIN — CARVEDILOL 3.12 MG: 3.12 TABLET, FILM COATED ORAL at 09:16

## 2019-11-28 RX ADMIN — LISINOPRIL 2.5 MG: 2.5 TABLET ORAL at 09:16

## 2019-11-28 RX ADMIN — ATORVASTATIN CALCIUM 40 MG: 40 TABLET, FILM COATED ORAL at 21:34

## 2019-11-28 RX ADMIN — ASPIRIN 81 MG 81 MG: 81 TABLET ORAL at 09:16

## 2019-11-28 RX ADMIN — ACETAMINOPHEN 650 MG: 325 TABLET ORAL at 19:00

## 2019-11-28 RX ADMIN — Medication 10 ML: at 09:17

## 2019-11-28 RX ADMIN — Medication 10 ML: at 21:38

## 2019-11-28 RX ADMIN — FAMOTIDINE 20 MG: 20 TABLET ORAL at 09:16

## 2019-11-28 RX ADMIN — CARVEDILOL 3.12 MG: 3.12 TABLET, FILM COATED ORAL at 18:01

## 2019-11-28 ASSESSMENT — PAIN DESCRIPTION - PROGRESSION
CLINICAL_PROGRESSION: GRADUALLY WORSENING

## 2019-11-28 ASSESSMENT — PAIN SCALES - GENERAL
PAINLEVEL_OUTOF10: 3
PAINLEVEL_OUTOF10: 0
PAINLEVEL_OUTOF10: 5

## 2019-11-29 LAB
ANCA IFA: NORMAL
ANTI-NUCLEAR ANTIBODY (ANA): NEGATIVE

## 2019-11-29 PROCEDURE — 2060000000 HC ICU INTERMEDIATE R&B

## 2019-11-29 PROCEDURE — 6370000000 HC RX 637 (ALT 250 FOR IP): Performed by: INTERNAL MEDICINE

## 2019-11-29 PROCEDURE — 2580000003 HC RX 258: Performed by: INTERNAL MEDICINE

## 2019-11-29 PROCEDURE — 93926 LOWER EXTREMITY STUDY: CPT

## 2019-11-29 PROCEDURE — 99233 SBSQ HOSP IP/OBS HIGH 50: CPT | Performed by: INTERNAL MEDICINE

## 2019-11-29 RX ADMIN — Medication 10 ML: at 11:23

## 2019-11-29 RX ADMIN — FAMOTIDINE 20 MG: 20 TABLET ORAL at 20:54

## 2019-11-29 RX ADMIN — CARVEDILOL 3.12 MG: 3.12 TABLET, FILM COATED ORAL at 18:01

## 2019-11-29 RX ADMIN — Medication 10 ML: at 20:55

## 2019-11-29 RX ADMIN — ATORVASTATIN CALCIUM 40 MG: 40 TABLET, FILM COATED ORAL at 20:54

## 2019-11-29 RX ADMIN — FAMOTIDINE 20 MG: 20 TABLET ORAL at 11:21

## 2019-11-29 RX ADMIN — LISINOPRIL 2.5 MG: 2.5 TABLET ORAL at 11:22

## 2019-11-29 RX ADMIN — ASPIRIN 81 MG 81 MG: 81 TABLET ORAL at 11:22

## 2019-11-29 RX ADMIN — CARVEDILOL 3.12 MG: 3.12 TABLET, FILM COATED ORAL at 11:22

## 2019-11-29 ASSESSMENT — PAIN DESCRIPTION - PROGRESSION
CLINICAL_PROGRESSION: GRADUALLY WORSENING
CLINICAL_PROGRESSION: GRADUALLY WORSENING

## 2019-11-29 ASSESSMENT — PAIN SCALES - GENERAL: PAINLEVEL_OUTOF10: 0

## 2019-11-30 VITALS
TEMPERATURE: 98 F | OXYGEN SATURATION: 100 % | HEART RATE: 80 BPM | RESPIRATION RATE: 18 BRPM | SYSTOLIC BLOOD PRESSURE: 110 MMHG | DIASTOLIC BLOOD PRESSURE: 73 MMHG | WEIGHT: 188.27 LBS | BODY MASS INDEX: 30.39 KG/M2

## 2019-11-30 LAB — PLATELET # BLD: 263 K/UL (ref 135–450)

## 2019-11-30 PROCEDURE — 6370000000 HC RX 637 (ALT 250 FOR IP): Performed by: INTERNAL MEDICINE

## 2019-11-30 PROCEDURE — 2580000003 HC RX 258: Performed by: INTERNAL MEDICINE

## 2019-11-30 PROCEDURE — 85049 AUTOMATED PLATELET COUNT: CPT

## 2019-11-30 PROCEDURE — 36415 COLL VENOUS BLD VENIPUNCTURE: CPT

## 2019-11-30 RX ORDER — CARVEDILOL 3.12 MG/1
3.12 TABLET ORAL 2 TIMES DAILY WITH MEALS
Qty: 60 TABLET | Refills: 3 | Status: SHIPPED | OUTPATIENT
Start: 2019-11-30 | End: 2020-02-10 | Stop reason: ALTCHOICE

## 2019-11-30 RX ORDER — ASPIRIN 81 MG/1
81 TABLET, CHEWABLE ORAL DAILY
Qty: 30 TABLET | Refills: 3 | Status: SHIPPED | OUTPATIENT
Start: 2019-11-30 | End: 2020-02-25 | Stop reason: SDUPTHER

## 2019-11-30 RX ORDER — LISINOPRIL 2.5 MG/1
2.5 TABLET ORAL DAILY
Qty: 30 TABLET | Refills: 3 | Status: SHIPPED | OUTPATIENT
Start: 2019-11-30 | End: 2020-02-25 | Stop reason: SDUPTHER

## 2019-11-30 RX ORDER — ATORVASTATIN CALCIUM 40 MG/1
40 TABLET, FILM COATED ORAL NIGHTLY
Qty: 30 TABLET | Refills: 3 | Status: SHIPPED | OUTPATIENT
Start: 2019-11-30 | End: 2020-02-25 | Stop reason: SDUPTHER

## 2019-11-30 RX ADMIN — LISINOPRIL 2.5 MG: 2.5 TABLET ORAL at 08:45

## 2019-11-30 RX ADMIN — Medication 10 ML: at 08:46

## 2019-11-30 RX ADMIN — ASPIRIN 81 MG 81 MG: 81 TABLET ORAL at 08:45

## 2019-11-30 RX ADMIN — FAMOTIDINE 20 MG: 20 TABLET ORAL at 08:45

## 2019-11-30 RX ADMIN — CARVEDILOL 3.12 MG: 3.12 TABLET, FILM COATED ORAL at 08:45

## 2019-12-02 ENCOUNTER — TELEPHONE (OUTPATIENT)
Dept: CARDIOLOGY CLINIC | Age: 39
End: 2019-12-02

## 2019-12-04 RX ORDER — CLOPIDOGREL BISULFATE 75 MG/1
75 TABLET ORAL DAILY
Qty: 30 TABLET | Refills: 5 | Status: SHIPPED | OUTPATIENT
Start: 2019-12-04 | End: 2020-02-24 | Stop reason: SDUPTHER

## 2019-12-04 RX ORDER — CLOPIDOGREL BISULFATE 75 MG/1
75 TABLET ORAL DAILY
COMMUNITY
End: 2019-12-04 | Stop reason: SDUPTHER

## 2019-12-06 ENCOUNTER — TELEPHONE (OUTPATIENT)
Dept: CARDIOLOGY CLINIC | Age: 39
End: 2019-12-06

## 2019-12-09 ENCOUNTER — TELEPHONE (OUTPATIENT)
Dept: CARDIOLOGY CLINIC | Age: 39
End: 2019-12-09

## 2019-12-10 ENCOUNTER — OFFICE VISIT (OUTPATIENT)
Dept: ENT CLINIC | Age: 39
End: 2019-12-10
Payer: COMMERCIAL

## 2019-12-10 ENCOUNTER — PROCEDURE VISIT (OUTPATIENT)
Dept: AUDIOLOGY | Age: 39
End: 2019-12-10
Payer: COMMERCIAL

## 2019-12-10 VITALS
DIASTOLIC BLOOD PRESSURE: 64 MMHG | HEART RATE: 79 BPM | HEIGHT: 67 IN | TEMPERATURE: 97.9 F | WEIGHT: 174 LBS | SYSTOLIC BLOOD PRESSURE: 94 MMHG | BODY MASS INDEX: 27.31 KG/M2

## 2019-12-10 DIAGNOSIS — H91.90 HEARING LOSS, UNSPECIFIED HEARING LOSS TYPE, UNSPECIFIED LATERALITY: Primary | ICD-10-CM

## 2019-12-10 DIAGNOSIS — H93.13 TINNITUS, BILATERAL: ICD-10-CM

## 2019-12-10 DIAGNOSIS — H90.3 SENSORINEURAL HEARING LOSS, BILATERAL: Primary | ICD-10-CM

## 2019-12-10 PROCEDURE — 92557 COMPREHENSIVE HEARING TEST: CPT | Performed by: AUDIOLOGIST

## 2019-12-10 PROCEDURE — 99203 OFFICE O/P NEW LOW 30 MIN: CPT | Performed by: OTOLARYNGOLOGY

## 2019-12-12 ENCOUNTER — TELEPHONE (OUTPATIENT)
Dept: ENT CLINIC | Age: 39
End: 2019-12-12

## 2019-12-12 ENCOUNTER — TELEPHONE (OUTPATIENT)
Dept: CARDIOLOGY CLINIC | Age: 39
End: 2019-12-12

## 2019-12-12 ENCOUNTER — PROCEDURE VISIT (OUTPATIENT)
Dept: AUDIOLOGY | Age: 39
End: 2019-12-12

## 2019-12-12 DIAGNOSIS — H93.13 TINNITUS, BILATERAL: ICD-10-CM

## 2019-12-12 DIAGNOSIS — H90.3 SENSORINEURAL HEARING LOSS, BILATERAL: Primary | ICD-10-CM

## 2019-12-12 PROCEDURE — 99999 PR OFFICE/OUTPT VISIT,PROCEDURE ONLY: CPT | Performed by: AUDIOLOGIST

## 2019-12-16 ENCOUNTER — TELEPHONE (OUTPATIENT)
Dept: ENT CLINIC | Age: 39
End: 2019-12-16

## 2019-12-17 ENCOUNTER — TELEPHONE (OUTPATIENT)
Dept: ENT CLINIC | Age: 39
End: 2019-12-17

## 2019-12-18 ENCOUNTER — TELEPHONE (OUTPATIENT)
Dept: AUDIOLOGY | Age: 39
End: 2019-12-18

## 2019-12-18 ENCOUNTER — HOSPITAL ENCOUNTER (OUTPATIENT)
Dept: MRI IMAGING | Age: 39
Discharge: HOME | End: 2019-12-18
Payer: COMMERCIAL

## 2019-12-18 DIAGNOSIS — H90.3 SENSORINEURAL HEARING LOSS, BILATERAL: ICD-10-CM

## 2019-12-18 PROCEDURE — 6360000004 HC RX CONTRAST MEDICATION: Performed by: OTOLARYNGOLOGY

## 2019-12-18 PROCEDURE — A9579 GAD-BASE MR CONTRAST NOS,1ML: HCPCS | Performed by: OTOLARYNGOLOGY

## 2019-12-18 PROCEDURE — 70553 MRI BRAIN STEM W/O & W/DYE: CPT

## 2019-12-18 RX ADMIN — GADOTERIDOL 15 ML: 279.3 INJECTION, SOLUTION INTRAVENOUS at 10:11

## 2019-12-19 ENCOUNTER — PROCEDURE VISIT (OUTPATIENT)
Dept: AUDIOLOGY | Age: 39
End: 2019-12-19
Payer: COMMERCIAL

## 2019-12-19 DIAGNOSIS — H93.13 TINNITUS, BILATERAL: ICD-10-CM

## 2019-12-19 DIAGNOSIS — H90.3 SENSORINEURAL HEARING LOSS, BILATERAL: Primary | ICD-10-CM

## 2019-12-19 PROCEDURE — V5261 HEARING AID, DIGIT, BIN, BTE: HCPCS | Performed by: AUDIOLOGIST

## 2019-12-23 ENCOUNTER — OFFICE VISIT (OUTPATIENT)
Dept: CARDIOLOGY CLINIC | Age: 39
End: 2019-12-23
Payer: COMMERCIAL

## 2019-12-23 ENCOUNTER — HOSPITAL ENCOUNTER (OUTPATIENT)
Age: 39
Discharge: HOME | End: 2019-12-23
Payer: COMMERCIAL

## 2019-12-23 VITALS
WEIGHT: 174.6 LBS | HEART RATE: 84 BPM | HEIGHT: 67 IN | SYSTOLIC BLOOD PRESSURE: 88 MMHG | OXYGEN SATURATION: 99 % | DIASTOLIC BLOOD PRESSURE: 58 MMHG | BODY MASS INDEX: 27.4 KG/M2

## 2019-12-23 DIAGNOSIS — I21.3 ST ELEVATION MYOCARDIAL INFARCTION (STEMI), SUBSEQUENT EPISODE OF CARE (HCC): ICD-10-CM

## 2019-12-23 DIAGNOSIS — I25.42 SPONTANEOUS DISSECTION OF CORONARY ARTERY: ICD-10-CM

## 2019-12-23 DIAGNOSIS — I25.5 ISCHEMIC CARDIOMYOPATHY: Primary | ICD-10-CM

## 2019-12-23 DIAGNOSIS — I25.5 ISCHEMIC CARDIOMYOPATHY: ICD-10-CM

## 2019-12-23 DIAGNOSIS — I21.02 ACUTE ST ELEVATION MYOCARDIAL INFARCTION (STEMI) INVOLVING LEFT ANTERIOR DESCENDING (LAD) CORONARY ARTERY (HCC): ICD-10-CM

## 2019-12-23 LAB
A/G RATIO: 1.2 (ref 1.1–2.2)
ALBUMIN SERPL-MCNC: 4.1 G/DL (ref 3.4–5)
ALP BLD-CCNC: 92 U/L (ref 40–129)
ALT SERPL-CCNC: 29 U/L (ref 10–40)
ANION GAP SERPL CALCULATED.3IONS-SCNC: 14 MMOL/L (ref 3–16)
AST SERPL-CCNC: 22 U/L (ref 15–37)
BILIRUB SERPL-MCNC: 0.3 MG/DL (ref 0–1)
BUN BLDV-MCNC: 11 MG/DL (ref 7–20)
CALCIUM SERPL-MCNC: 9.4 MG/DL (ref 8.3–10.6)
CHLORIDE BLD-SCNC: 103 MMOL/L (ref 99–110)
CO2: 24 MMOL/L (ref 21–32)
CREAT SERPL-MCNC: 0.7 MG/DL (ref 0.6–1.1)
GFR AFRICAN AMERICAN: >60
GFR NON-AFRICAN AMERICAN: >60
GLOBULIN: 3.5 G/DL
GLUCOSE BLD-MCNC: 98 MG/DL (ref 70–99)
POTASSIUM SERPL-SCNC: 4.3 MMOL/L (ref 3.5–5.1)
PRO-BNP: 1618 PG/ML (ref 0–124)
SODIUM BLD-SCNC: 141 MMOL/L (ref 136–145)
TOTAL PROTEIN: 7.6 G/DL (ref 6.4–8.2)

## 2019-12-23 PROCEDURE — G8598 ASA/ANTIPLAT THER USED: HCPCS | Performed by: NURSE PRACTITIONER

## 2019-12-23 PROCEDURE — 83880 ASSAY OF NATRIURETIC PEPTIDE: CPT

## 2019-12-23 PROCEDURE — 99215 OFFICE O/P EST HI 40 MIN: CPT | Performed by: NURSE PRACTITIONER

## 2019-12-23 PROCEDURE — 1036F TOBACCO NON-USER: CPT | Performed by: NURSE PRACTITIONER

## 2019-12-23 PROCEDURE — 36415 COLL VENOUS BLD VENIPUNCTURE: CPT

## 2019-12-23 PROCEDURE — G8484 FLU IMMUNIZE NO ADMIN: HCPCS | Performed by: NURSE PRACTITIONER

## 2019-12-23 PROCEDURE — 80053 COMPREHEN METABOLIC PANEL: CPT

## 2019-12-23 PROCEDURE — G8419 CALC BMI OUT NRM PARAM NOF/U: HCPCS | Performed by: NURSE PRACTITIONER

## 2019-12-23 PROCEDURE — 1111F DSCHRG MED/CURRENT MED MERGE: CPT | Performed by: NURSE PRACTITIONER

## 2019-12-23 PROCEDURE — G8427 DOCREV CUR MEDS BY ELIG CLIN: HCPCS | Performed by: NURSE PRACTITIONER

## 2019-12-26 RX ORDER — FUROSEMIDE 20 MG/1
20 TABLET ORAL DAILY PRN
Qty: 30 TABLET | Refills: 5 | Status: SHIPPED | OUTPATIENT
Start: 2019-12-26 | End: 2021-07-01 | Stop reason: ALTCHOICE

## 2019-12-30 ENCOUNTER — PROCEDURE VISIT (OUTPATIENT)
Dept: AUDIOLOGY | Age: 39
End: 2019-12-30

## 2019-12-30 DIAGNOSIS — H90.3 SENSORINEURAL HEARING LOSS, BILATERAL: Primary | ICD-10-CM

## 2019-12-30 DIAGNOSIS — H93.13 TINNITUS, BILATERAL: ICD-10-CM

## 2019-12-30 PROCEDURE — 99999 PR OFFICE/OUTPT VISIT,PROCEDURE ONLY: CPT | Performed by: AUDIOLOGIST

## 2019-12-31 ENCOUNTER — OFFICE VISIT (OUTPATIENT)
Dept: RHEUMATOLOGY | Age: 39
End: 2019-12-31
Payer: COMMERCIAL

## 2019-12-31 VITALS
WEIGHT: 174 LBS | BODY MASS INDEX: 27.31 KG/M2 | SYSTOLIC BLOOD PRESSURE: 112 MMHG | HEIGHT: 67 IN | DIASTOLIC BLOOD PRESSURE: 76 MMHG

## 2019-12-31 DIAGNOSIS — I72.9 ANEURYSM (HCC): ICD-10-CM

## 2019-12-31 DIAGNOSIS — M24.9 HYPERMOBILE JOINTS: ICD-10-CM

## 2019-12-31 DIAGNOSIS — H90.3 SENSORINEURAL HEARING LOSS (SNHL) OF BOTH EARS: ICD-10-CM

## 2019-12-31 DIAGNOSIS — Z13.89 SCREENING FOR RHEUMATIC DISORDER: Primary | ICD-10-CM

## 2019-12-31 DIAGNOSIS — I25.42 CORONARY ARTERY DISSECTION: ICD-10-CM

## 2019-12-31 PROCEDURE — 99205 OFFICE O/P NEW HI 60 MIN: CPT | Performed by: INTERNAL MEDICINE

## 2019-12-31 PROCEDURE — G8419 CALC BMI OUT NRM PARAM NOF/U: HCPCS | Performed by: INTERNAL MEDICINE

## 2019-12-31 PROCEDURE — G8484 FLU IMMUNIZE NO ADMIN: HCPCS | Performed by: INTERNAL MEDICINE

## 2019-12-31 PROCEDURE — G8427 DOCREV CUR MEDS BY ELIG CLIN: HCPCS | Performed by: INTERNAL MEDICINE

## 2020-01-02 ENCOUNTER — TELEPHONE (OUTPATIENT)
Dept: CARDIOLOGY CLINIC | Age: 40
End: 2020-01-02

## 2020-01-02 ENCOUNTER — TELEPHONE (OUTPATIENT)
Dept: RHEUMATOLOGY | Age: 40
End: 2020-01-02

## 2020-01-02 NOTE — TELEPHONE ENCOUNTER
----- Message from Dorina Mayers MD sent at 12/31/2019  3:23 PM EST -----    Please provide her central scheduling number to schedule 2 different studies-angiogram of her brain and angiogram of her chest and abdomen. These studies needs to be done on 2 separate days, can be done at [facility].

## 2020-01-02 NOTE — TELEPHONE ENCOUNTER
Patient's  called requesting to speak with nickyp regarding test ordered for the patient by her rheumatologist.

## 2020-01-02 NOTE — TELEPHONE ENCOUNTER
Dr Meagan Alex ordered angiogram of her brain and angiogram of her chest and abdomen.  Please advise

## 2020-01-02 NOTE — TELEPHONE ENCOUNTER
I agree with the test. That was the same test I was going to order when I saw her in hospital. I spoke with pt and  about this a few times already.  Please heave results sent to me as well

## 2020-01-03 ENCOUNTER — TELEPHONE (OUTPATIENT)
Dept: ENT CLINIC | Age: 40
End: 2020-01-03

## 2020-01-03 NOTE — TELEPHONE ENCOUNTER
Pt  wondering if these tests are absolutely necessary, also if pt is strong enough for these tests. Pt  states this was not discussed with either one of them. Pt will likely need to be off work for these tests. Pt  confused and frustrated, would like a call back.

## 2020-01-03 NOTE — TELEPHONE ENCOUNTER
Called pt per Dr. Gary Camarena to get her scheduled in our clinic with-in the next 1-2 weeks. I left a VM on pts emergency contacts phone asking him to call us back at our [facility] office, I gave him our main number to call [phone].

## 2020-01-03 NOTE — TELEPHONE ENCOUNTER
Will d/w pt at next appt. I have discussed with pt and . Given her coronary dissection, this is part of the workup.

## 2020-01-15 ENCOUNTER — OFFICE VISIT (OUTPATIENT)
Dept: ENT CLINIC | Age: 40
End: 2020-01-15
Payer: COMMERCIAL

## 2020-01-15 VITALS
HEIGHT: 67 IN | SYSTOLIC BLOOD PRESSURE: 95 MMHG | HEART RATE: 78 BPM | TEMPERATURE: 97.2 F | BODY MASS INDEX: 27.21 KG/M2 | DIASTOLIC BLOOD PRESSURE: 64 MMHG | WEIGHT: 173.4 LBS

## 2020-01-15 PROCEDURE — G8419 CALC BMI OUT NRM PARAM NOF/U: HCPCS | Performed by: OTOLARYNGOLOGY

## 2020-01-15 PROCEDURE — 99213 OFFICE O/P EST LOW 20 MIN: CPT | Performed by: OTOLARYNGOLOGY

## 2020-01-15 PROCEDURE — 31575 DIAGNOSTIC LARYNGOSCOPY: CPT | Performed by: OTOLARYNGOLOGY

## 2020-01-15 PROCEDURE — 1036F TOBACCO NON-USER: CPT | Performed by: OTOLARYNGOLOGY

## 2020-01-15 PROCEDURE — G8484 FLU IMMUNIZE NO ADMIN: HCPCS | Performed by: OTOLARYNGOLOGY

## 2020-01-15 PROCEDURE — G8427 DOCREV CUR MEDS BY ELIG CLIN: HCPCS | Performed by: OTOLARYNGOLOGY

## 2020-01-15 RX ORDER — OMEPRAZOLE 40 MG/1
40 CAPSULE, DELAYED RELEASE ORAL
Qty: 90 CAPSULE | Refills: 1 | Status: SHIPPED | OUTPATIENT
Start: 2020-01-15 | End: 2020-02-25 | Stop reason: ALTCHOICE

## 2020-01-15 RX ORDER — NIZATIDINE 150 MG/1
150 CAPSULE ORAL NIGHTLY
Qty: 60 CAPSULE | Refills: 1 | Status: SHIPPED | OUTPATIENT
Start: 2020-01-15 | End: 2020-02-25 | Stop reason: ALTCHOICE

## 2020-01-15 NOTE — PROGRESS NOTES
HPI:    CONSTITUTIONAL: No weight loss, no fever, no night sweats, no chills  EYES: no vision changes, no blurry vision  EARS: no changes in hearing, no otalgia  NOSE: no epistaxis, no rhinorrhea  RESPIRATORY: no difficulty breathing, no shortness of breath  CV: no chest pain, no peripheral vascular disease  HEME: No coagulation disorder, no bleeding disorder  NEURO: No TIA or stroke-like symptoms  SKIN: No new rashes in the head and neck, no recent skin cancers  MOUTH: No new ulcers, no recent teeth infections  GASTROINTESTINAL: No diarrhea, stomach pain  PSYCH: No anxiety, no depression    PhysicalExam     Vitals:    01/15/20 0855   BP: 95/64   Site: Left Upper Arm   Position: Sitting   Cuff Size: Medium Adult   Pulse: 78   Temp: 97.2 °F (36.2 °C)   TempSrc: Oral   Weight: 173 lb 6.4 oz (78.7 kg)   Height: 5' 7\" (1.702 m)       PHYSICAL EXAM  BP 95/64 (Site: Left Upper Arm, Position: Sitting, Cuff Size: Medium Adult)   Pulse 78   Temp 97.2 °F (36.2 °C) (Oral)   Ht 5' 7\" (1.702 m)   Wt 173 lb 6.4 oz (78.7 kg)   BMI 27.16 kg/m²     GENERAL: No Acute Distress, Alert and Oriented, no hoarseness  EYES: EOMI, Anti-icteric  NOSE: On anterior rhinoscopy there is no epistaxis, nasal mucosa within normal limits, no purulent drainage  EARS: Normal external appearance; bilateral BTE hearing aids in place  FACE: 1/6 House-Brackmann Scale, symmetric, sensation equal bilaterally  ORAL CAVITY: No masses or lesions palpated, uvula is midline, moist mucous membranes, 0+ tonsils, good dentition  -Dental mirror examination with no masses in the base of tongue, epiglottis upright and normal  NECK: Normal range of motion, no thyromegaly, trachea is midline, no lymphadenopathy, no neck masses, no crepitus  CHEST: Normal respiratory effort, no retractions, breathing comfortably  SKIN: No rashes, normal appearing skin, no evidence of skin lesions/tumors  NEURO: CN 2, 3, 4, 5, 6, 7, 11, 12 intact bilaterally       Procedure Flexible Laryngoscopy    Pre op: Globus sensation. Post op: Post-cricoid pachydermia  Procedure : Flexible Nasopharyngolaryngoscopy  Surgeon: DANILO Young  Anesthesia: Afrin with 2% lidocaine  Estimated Blood Loss: None    Procedure:   Flexible Laryngoscopy     After obtaining consent, the patient was placed in the examination chair in the upright position. Decongestant and topical anesthetic was sprayed in the After allowing adequate time for hemostatic effect, the flexible 4 mm laryngoscope was passed via the left nasal passage    Nasal Septum: No acute septal deviation, no septal hematoma or perforations noted   Nasal Findings: No active hemorrhage, no nasal masses appreciated   Nasopharynx: Clear, no masses or inflammation  Oropharynx: Bilateral tonsillar tissue absent, no exophytic masses  Base of Tongue: Lingual tonsils within normal limits, vallecula without effacement  Epiglottis: Upright, in normal anatomical position  True Vocal Cord: Anatomically normal, no masses or inflammation, normal abduction and adduction   False Vocal Cord: normal   Hypopharynx Mucosa: No masses or inflammation of the piriform sinuses, however watery edema of the postcricoid area noted  Arytenoids: Normal mucosa, no dislocation appreciated     * Patient tolerated the procedure well with no complications   * Patient was instructed not to eat for 30 minutes following procedure. * Patient was instructed that they may notice minor bleeding. Attestation:   I was present for the entire viewing, including introduction and removal of the scope. Assessment and Plan     1. Laryngopharyngeal reflux (LPR)  Patient with prior overt signs of LPR, dysphagia for lettuce/corn, has changed diet since cardiac catheterization with improvement in this however the development of globus sensation and \"squeezing\" of the throat intermittently. NPL with no masses identified however suspicion for LPR given post-cricoid edema.  Will treat

## 2020-01-15 NOTE — PATIENT INSTRUCTIONS
Patient Education        Gastroesophageal Reflux Disease (GERD): Care Instructions  Your Care Instructions    Gastroesophageal reflux disease (GERD) is the backward flow of stomach acid into the esophagus. The esophagus is the tube that leads from your throat to your stomach. A one-way valve prevents the stomach acid from moving up into this tube. When you have GERD, this valve does not close tightly enough. If you have mild GERD symptoms including heartburn, you may be able to control the problem with antacids or over-the-counter medicine. Changing your diet, losing weight, and making other lifestyle changes can also help reduce symptoms. Follow-up care is a key part of your treatment and safety. Be sure to make and go to all appointments, and call your doctor if you are having problems. It's also a good idea to know your test results and keep a list of the medicines you take. How can you care for yourself at home? · Take your medicines exactly as prescribed. Call your doctor if you think you are having a problem with your medicine. · Your doctor may recommend over-the-counter medicine. For mild or occasional indigestion, antacids, such as Tums, Gaviscon, Mylanta, or Maalox, may help. Your doctor also may recommend over-the-counter acid reducers, such as Pepcid AC, Tagamet HB, Zantac 75, or Prilosec. Read and follow all instructions on the label. If you use these medicines often, talk with your doctor. · Change your eating habits. ? It's best to eat several small meals instead of two or three large meals. ? After you eat, wait 2 to 3 hours before you lie down. ? Chocolate, mint, and alcohol can make GERD worse. ? Spicy foods, foods that have a lot of acid (like tomatoes and oranges), and coffee can make GERD symptoms worse in some people. If your symptoms are worse after you eat a certain food, you may want to stop eating that food to see if your symptoms get better.   · Do not smoke or chew tobacco. Smoking can make GERD worse. If you need help quitting, talk to your doctor about stop-smoking programs and medicines. These can increase your chances of quitting for good. · If you have GERD symptoms at night, raise the head of your bed 6 to 8 inches by putting the frame on blocks or placing a foam wedge under the head of your mattress. (Adding extra pillows does not work.)  · Do not wear tight clothing around your middle. · Lose weight if you need to. Losing just 5 to 10 pounds can help. When should you call for help? Call your doctor now or seek immediate medical care if:    · You have new or different belly pain.     · Your stools are black and tarlike or have streaks of blood.    Watch closely for changes in your health, and be sure to contact your doctor if:    · Your symptoms have not improved after 2 days.     · Food seems to catch in your throat or chest.   Where can you learn more? Go to [email or web address] and sign in to your [employer] account. Enter M058 in the [facility] box to learn more about \"Gastroesophageal Reflux Disease (GERD): Care Instructions. \"     If you do not have an account, please click on the \"Sign Up Now\" link. Current as of: August 11, 2019  Content Version: 12.3  © 2235-0586 Healthwise, Incorporated. Care instructions adapted under license by [facility] ([facility]). If you have questions about a medical condition or this instruction, always ask your healthcare professional. [place] [address] any warranty or liability for your use of this information.

## 2020-01-27 NOTE — PROGRESS NOTES
Teressa Morning   1980, 37 y.o. female   [id number]     HEARING Jeni Medrano was seen today, 1/29/2020,  for final follow-up after being fit with Achieve3000 M70-R hearing aids. PROCEDURES:     Patient noted overall consistent use and good sound quality. Live speech mapping was completed with a good match to NAL-NL2 targets from 250-8000 Hz, AU. After adjustments patient reported comfort and improved sound quality. Reviewed cleaning and maintenance of hearing aids. Patient demonstrated she was able to change wax guards and domes in office. Patient Education:       - Verbally and visually reviewed care and maintenance of devices. - Reviewed results of live speech mapping and explained programming adjustments. Information was verbally shared with patient during appointment. No charge for today's appointment. RECOMMENDATIONS:     1. Continue wearing both HAs during all waking hours. 2. HAC every 3-4 months. 3. Retest hearing as medically indicated or sooner if changes are noticed.     Lamar Guidry  Audiologist

## 2020-01-28 NOTE — PROGRESS NOTES
[facility]   Cardiovascular Evaluation    PATIENT: Terra Reece  DATE: 2020  MRN: [id number]  CSN: [id number]  : 1980      Primary Care Doctor: No primary care provider on file. Reason for evaluation:   1 Month Follow-Up; Cardiomyopathy; and Palpitations (skipping of a beat, fluttering)      Subjective:   History of present illness on initial date of evaluation:   Terra Reece is a 37 y.o. patient who presents for hospital follow up. She presented to the hospital on 19 with complaints of chest pain. [facility] performed. Acute Anterior STEMI with Spontaneous Coronary Artery Dissection. Today she presents with her  to discuss her testing and medications. She reports feeling ok. She states she does feel a fluttering in her chest.  She states more so when she comes home from work. She denies chest pain, sob, dizziness or syncope. Patient Active Problem List   Diagnosis    Acute ST elevation myocardial infarction (STEMI) ([address]    Coronary artery dissection    Hypomagnesemia    Bilateral hearing loss    Ischemic cardiomyopathy    NSVT (nonsustained ventricular tachycardia) (HCC)    Sensorineural hearing loss, bilateral    Palpitations         Past Medical History:   has a past medical history of Allergic rhinitis, GERD (gastroesophageal reflux disease), Headache, Hearing disorder, Ischemic cardiomyopathy, STEMI (ST elevation myocardial infarction) ([address] and Tinnitus. Surgical History:   has a past surgical history that includes knee surgery and Cardiac catheterization (2019). Social History:   reports that she has never smoked. She has never used smokeless tobacco. She reports current alcohol use. She reports that she does not use drugs. Family History:  No evidence for sudden cardiac death or premature CAD    Home Medications:  Reviewed and are listed in nursing record.  and/or listed below  Current Outpatient Medications descending artery. Grade I diastolic dysfunction with normal filling pressure. Mild mitral regurgitation. STRESS TEST:    CARDIAC CATH:    VASCULAR/OTHER IMAGING:      Assessment and Plan   Alberto Guillen is a 37 y.o. female who presents today for the following problems:        1. STEMI: trop peaked at 5.1  2. Spontanous coronary dissection:              - conservative management for now >50% ST elevation resolution on AM ECG              IABP removed 11/27  3. Ischemic cardiomyopathy: LVEF 35-40%  4. Hearing loss: present since birth per pt  5. NSVT: improved  6. Palpitatons: new     MD PLAN  1. Pt doing well, no angina or SOB  2. Appreciate Rheum and ENT notes   - pt doing better with ehearing aid  3. Check limited echo for LVEF   - if reduced, may need repeat cath (pt and  hesitiant)  4. DOES need Chest,A,P CTA to eval for FMD  5. Cont coreg and lisinopril              - educated on need for Birth control on lisinopril  6. 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 2 wk monitor       Patient Active Problem List   Diagnosis    Acute ST elevation myocardial infarction (STEMI) [facility]    Coronary artery dissection    Hypomagnesemia    Bilateral hearing loss    Ischemic cardiomyopathy    NSVT (nonsustained ventricular tachycardia) (HCC)    Sensorineural hearing loss, bilateral    Palpitations       Patient Plan:  1. 2 week cardiac event monitor   2. Echocardiogram to view size and strength of heart   3. We will call you with the results        It is a pleasure to assist in the care of Alberto Guillen. Please call with any questions. This note was scribed in the presence of Dr. Helen Esquivel, by Pamela Abdalla MD, personally performed the services described in this documentation as scribed by the above signed scribe in my presence, and it is both accurate and complete to the best of our ability and knowledge.  I agree with the details independently gathered by my clinical support staff, while the remaining scribed note accurately describes my personal service to the patient. The above RN is working as a scribe for and in the presence of myself . Working as a scribe, the RN may have prepopulated components of this note with my historical intellectual property under my direct supervision. Any additions to this intellectual property were performed at my direction. Furthermore, the content and accuracy of this note have been reviewed by me to the best of my ability.          Sadaf Mcghee MD, [facility] Cardiologist  [address]  [phone] [address] [place] [address]  [phone] [address] [place] [address]

## 2020-01-29 ENCOUNTER — TELEPHONE (OUTPATIENT)
Dept: CARDIOLOGY CLINIC | Age: 40
End: 2020-01-29

## 2020-01-29 ENCOUNTER — PROCEDURE VISIT (OUTPATIENT)
Dept: AUDIOLOGY | Age: 40
End: 2020-01-29

## 2020-01-29 ENCOUNTER — OFFICE VISIT (OUTPATIENT)
Dept: CARDIOLOGY CLINIC | Age: 40
End: 2020-01-29
Payer: COMMERCIAL

## 2020-01-29 VITALS
HEART RATE: 67 BPM | DIASTOLIC BLOOD PRESSURE: 74 MMHG | SYSTOLIC BLOOD PRESSURE: 108 MMHG | OXYGEN SATURATION: 97 % | BODY MASS INDEX: 27.03 KG/M2 | HEIGHT: 67 IN | WEIGHT: 172.2 LBS

## 2020-01-29 PROBLEM — R00.2 PALPITATIONS: Status: ACTIVE | Noted: 2020-01-29

## 2020-01-29 PROCEDURE — 99214 OFFICE O/P EST MOD 30 MIN: CPT | Performed by: INTERNAL MEDICINE

## 2020-01-29 PROCEDURE — G8427 DOCREV CUR MEDS BY ELIG CLIN: HCPCS | Performed by: INTERNAL MEDICINE

## 2020-01-29 PROCEDURE — G8484 FLU IMMUNIZE NO ADMIN: HCPCS | Performed by: INTERNAL MEDICINE

## 2020-01-29 PROCEDURE — 99999 PR OFFICE/OUTPT VISIT,PROCEDURE ONLY: CPT | Performed by: AUDIOLOGIST

## 2020-01-29 PROCEDURE — G8419 CALC BMI OUT NRM PARAM NOF/U: HCPCS | Performed by: INTERNAL MEDICINE

## 2020-01-29 PROCEDURE — 1036F TOBACCO NON-USER: CPT | Performed by: INTERNAL MEDICINE

## 2020-01-29 NOTE — PATIENT INSTRUCTIONS
Patient Plan:  1. 2 week cardiac event monitor   2. Echocardiogram to view size and strength of heart   3.  We will call you with the results

## 2020-01-29 NOTE — LETTER
[address] Cardiology - [address]  Phone: [phone]  Fax: [phone]    Mya Slater MD        January 29, 2020     [address] [place] [address]      To Whom it May Concern:    Pamelia Fass is to remain on work restriction of no more than 20 hours a week and not to lift more than 25 lbs until further notice. If you have any questions or concerns, please don't hesitate to call.     Sincerely,        Mya Slater MD

## 2020-01-29 NOTE — TELEPHONE ENCOUNTER
Monitor placed by [address] [place] [address]  Length of monitor 14 DAY  Monitor ordered by Batsheva Nicole  Serial number [id number]  Kit ID [id number]  Activation successful prior to pt leaving office?  Yes

## 2020-02-03 ENCOUNTER — TELEPHONE (OUTPATIENT)
Dept: ENT CLINIC | Age: 40
End: 2020-02-03

## 2020-02-03 NOTE — TELEPHONE ENCOUNTER
Called pharmacy and spoke to Yemi regarding the \"alternative medication request\".  The nizatidine 150 mg Dr. Sherryle Porter prescribed was on backorder, I spoke to him and he told me to call the pharmacy to see if a new prescription was needed, if so he told me to have them give the pt ranitidine 150 mg qhs.

## 2020-02-07 ENCOUNTER — TELEPHONE (OUTPATIENT)
Dept: CARDIOLOGY CLINIC | Age: 40
End: 2020-02-07

## 2020-02-07 NOTE — TELEPHONE ENCOUNTER
SWP's spouse re: rash that developed on patient from heart monitor. Per Ace Wayne, she advised to have pt remove monitor. I instructed the spouse to have the pt to remove device and mail it back. Spouse verbalized understanding.

## 2020-02-10 ENCOUNTER — TELEPHONE (OUTPATIENT)
Dept: CARDIOLOGY CLINIC | Age: 40
End: 2020-02-10

## 2020-02-10 PROCEDURE — 93268 ECG RECORD/REVIEW: CPT | Performed by: INTERNAL MEDICINE

## 2020-02-10 RX ORDER — METOPROLOL SUCCINATE 25 MG/1
25 TABLET, EXTENDED RELEASE ORAL DAILY
Qty: 30 TABLET | Refills: 11 | Status: SHIPPED | OUTPATIENT
Start: 2020-02-10 | End: 2020-02-25 | Stop reason: SDUPTHER

## 2020-02-12 ENCOUNTER — TELEPHONE (OUTPATIENT)
Dept: CARDIOLOGY CLINIC | Age: 40
End: 2020-02-12

## 2020-02-20 ENCOUNTER — HOSPITAL ENCOUNTER (OUTPATIENT)
Dept: NON INVASIVE DIAGNOSTICS | Age: 40
Discharge: HOME | End: 2020-02-20
Payer: COMMERCIAL

## 2020-02-20 LAB
LV EF: 45 %
LVEF MODALITY: NORMAL

## 2020-02-20 PROCEDURE — 93306 TTE W/DOPPLER COMPLETE: CPT

## 2020-02-21 ENCOUNTER — TELEPHONE (OUTPATIENT)
Dept: CARDIOLOGY CLINIC | Age: 40
End: 2020-02-21

## 2020-02-21 NOTE — LETTER
[address] Cardiology - [address]  Phone: [phone]  Fax: [phone]    Funmilayo Wade MD        February 24, 2020     [facility] [address]    To Whom it May Concern:    Patient may return to work. No lifting greater than 40 lbs. If you have any questions or concerns, please don't hesitate to call.     Sincerely,        Funmilayo Wade MD

## 2020-02-21 NOTE — TELEPHONE ENCOUNTER
----- Message from Alexsandra Razo MD sent at 2/20/2020 12:09 PM EST -----  Let pt know that echo shows some improvement, not quite back to normal. Continue meds and f/u as usual for now

## 2020-02-21 NOTE — TELEPHONE ENCOUNTER
Pt's  called back asking if pt is now cleared to return to work. If so, pt will needs letter to return to work. Pt also would like a copy of DNA test results,  states. Please advise.

## 2020-02-24 ENCOUNTER — TELEPHONE (OUTPATIENT)
Dept: CARDIOLOGY CLINIC | Age: 40
End: 2020-02-24

## 2020-02-24 RX ORDER — CLOPIDOGREL BISULFATE 75 MG/1
75 TABLET ORAL DAILY
Qty: 90 TABLET | Refills: 3 | Status: SHIPPED | OUTPATIENT
Start: 2020-02-24 | End: 2021-05-07

## 2020-02-24 NOTE — PROGRESS NOTES
of breath, weight gain, or edema) 30 tablet 5    Coenzyme Q10 (COQ10 PO) Take by mouth      lisinopril (PRINIVIL;ZESTRIL) 2.5 MG tablet Take 1 tablet by mouth daily 30 tablet 3    atorvastatin (LIPITOR) 40 MG tablet Take 1 tablet by mouth nightly 30 tablet 3    aspirin 81 MG chewable tablet Take 1 tablet by mouth daily 30 tablet 3    Norgestim-Eth Estrad Triphasic (ORTHO TRI-CYCLEN, 28, PO) Take  by mouth. No current facility-administered medications for this visit.         Past Medical History:   Diagnosis Date    Allergic rhinitis     GERD (gastroesophageal reflux disease)     Headache     Hearing disorder     Ischemic cardiomyopathy 11/2019    due to Spontaneous coronary artery dissection    STEMI (ST elevation myocardial infarction) ([facility] 11/2019    spontaneous coronary dissection LAD    Tinnitus      Past Surgical History:   Procedure Laterality Date    CARDIAC CATHETERIZATION  11/26/2019    KNEE SURGERY       Family History   Problem Relation Age of Onset    Heart Attack Mother     Cancer Father         skin    Cancer Maternal Grandmother         skin     Social History     Socioeconomic History    Marital status:      Spouse name: Not on file    Number of children: Not on file    Years of education: Not on file    Highest education level: Not on file   Occupational History    Not on file   Social Needs    Financial resource strain: Not on file    Food insecurity:     Worry: Not on file     Inability: Not on file    Transportation needs:     Medical: Not on file     Non-medical: Not on file   Tobacco Use    Smoking status: Never Smoker    Smokeless tobacco: Never Used   Substance and Sexual Activity    Alcohol use: Yes     Comment: occ    Drug use: Never    Sexual activity: Yes     Partners: Male   Lifestyle    Physical activity:     Days per week: Not on file     Minutes per session: Not on file    Stress: Not on file   Relationships    Social connections: Talks on phone: Not on file     Gets together: Not on file     Attends [employer] service: Not on file     Active member of club or organization: Not on file     Attends meetings of clubs or organizations: Not on file     Relationship status: Not on file    Intimate partner violence:     Fear of current or ex partner: Not on file     Emotionally abused: Not on file     Physically abused: Not on file     Forced sexual activity: Not on file   Other Topics Concern    Not on file   Social History Narrative    Not on file       Review of Systems:   · Constitutional: there has been no unanticipated weight loss. There's been no change in energy level, sleep pattern, or activity level. · Eyes: No visual changes or diplopia. No scleral icterus. · ENT: No Headaches, hearing loss or vertigo. No mouth sores or sore throat. · Cardiovascular: Reviewed in HPI  · Respiratory: No cough or wheezing, no sputum production. No hematemesis. · Gastrointestinal: No abdominal pain, appetite loss, blood in stools. No change in bowel or bladder habits. · Genitourinary: No dysuria, trouble voiding, or hematuria. · Musculoskeletal:  No gait disturbance, weakness or joint complaints. · Integumentary: No rash or pruritis. · Neurological: No headache, diplopia, change in muscle strength, numbness or tingling. No change in gait, balance, coordination, mood, affect, memory, mentation, behavior. · Psychiatric: No anxiety, no depression. · Endocrine: No malaise, fatigue or temperature intolerance. No excessive thirst, fluid intake, or urination. No tremor. · Hematologic/Lymphatic: No abnormal bruising or bleeding, blood clots or swollen lymph nodes. · Allergic/Immunologic: No nasal congestion or hives. Physical Examination:    Vitals:    02/25/20 0938   BP: 90/60   Pulse: 80   SpO2: 99%   Weight: 172 lb (78 kg)   Height: 5' 7\" (1.702 m)     Body mass index is 26.94 kg/m².      Wt Readings from Last 3 Encounters:   02/25/20 172 lb attestation: This note was scribed in the presence of Darlin Coto M.D. By Blake Calderon RN     The scribe's documentation has been prepared under my direction and personally reviewed by me in its entirety. I confirm that the note above accurately reflects all work, treatment, procedures, and medical decision making performed by me. I appreciate the opportunity of cooperating in the care of this patient.     Darlin Coto M.D., [facility]

## 2020-02-25 ENCOUNTER — HOSPITAL ENCOUNTER (OUTPATIENT)
Age: 40
Discharge: HOME | End: 2020-02-25
Payer: COMMERCIAL

## 2020-02-25 ENCOUNTER — OFFICE VISIT (OUTPATIENT)
Dept: CARDIOLOGY CLINIC | Age: 40
End: 2020-02-25
Payer: COMMERCIAL

## 2020-02-25 VITALS
WEIGHT: 172 LBS | HEART RATE: 80 BPM | OXYGEN SATURATION: 99 % | DIASTOLIC BLOOD PRESSURE: 60 MMHG | HEIGHT: 67 IN | BODY MASS INDEX: 27 KG/M2 | SYSTOLIC BLOOD PRESSURE: 90 MMHG

## 2020-02-25 LAB
ANION GAP SERPL CALCULATED.3IONS-SCNC: 13 MMOL/L (ref 3–16)
BUN BLDV-MCNC: 8 MG/DL (ref 7–20)
CALCIUM SERPL-MCNC: 9 MG/DL (ref 8.3–10.6)
CHLORIDE BLD-SCNC: 104 MMOL/L (ref 99–110)
CHOLESTEROL, TOTAL: 114 MG/DL (ref 0–199)
CO2: 21 MMOL/L (ref 21–32)
CREAT SERPL-MCNC: 0.6 MG/DL (ref 0.6–1.1)
GFR AFRICAN AMERICAN: >60
GFR NON-AFRICAN AMERICAN: >60
GLUCOSE BLD-MCNC: 93 MG/DL (ref 70–99)
HDLC SERPL-MCNC: 39 MG/DL (ref 40–60)
LDL CHOLESTEROL CALCULATED: 62 MG/DL
POTASSIUM SERPL-SCNC: 4.2 MMOL/L (ref 3.5–5.1)
PRO-BNP: 595 PG/ML (ref 0–124)
SODIUM BLD-SCNC: 138 MMOL/L (ref 136–145)
TRIGL SERPL-MCNC: 67 MG/DL (ref 0–150)
TSH REFLEX: 1.48 UIU/ML (ref 0.27–4.2)
VLDLC SERPL CALC-MCNC: 13 MG/DL

## 2020-02-25 PROCEDURE — G8419 CALC BMI OUT NRM PARAM NOF/U: HCPCS | Performed by: INTERNAL MEDICINE

## 2020-02-25 PROCEDURE — 84443 ASSAY THYROID STIM HORMONE: CPT

## 2020-02-25 PROCEDURE — 80061 LIPID PANEL: CPT

## 2020-02-25 PROCEDURE — G8427 DOCREV CUR MEDS BY ELIG CLIN: HCPCS | Performed by: INTERNAL MEDICINE

## 2020-02-25 PROCEDURE — 99215 OFFICE O/P EST HI 40 MIN: CPT | Performed by: INTERNAL MEDICINE

## 2020-02-25 PROCEDURE — G8484 FLU IMMUNIZE NO ADMIN: HCPCS | Performed by: INTERNAL MEDICINE

## 2020-02-25 PROCEDURE — 80048 BASIC METABOLIC PNL TOTAL CA: CPT

## 2020-02-25 PROCEDURE — 83880 ASSAY OF NATRIURETIC PEPTIDE: CPT

## 2020-02-25 PROCEDURE — 36415 COLL VENOUS BLD VENIPUNCTURE: CPT

## 2020-02-25 RX ORDER — LISINOPRIL 2.5 MG/1
2.5 TABLET ORAL DAILY
Qty: 90 TABLET | Refills: 3 | Status: SHIPPED | OUTPATIENT
Start: 2020-02-25 | End: 2021-02-08

## 2020-02-25 RX ORDER — METOPROLOL SUCCINATE 25 MG/1
25 TABLET, EXTENDED RELEASE ORAL DAILY
Qty: 90 TABLET | Refills: 3 | Status: SHIPPED | OUTPATIENT
Start: 2020-02-25 | End: 2021-02-26 | Stop reason: SDUPTHER

## 2020-02-25 RX ORDER — ATORVASTATIN CALCIUM 40 MG/1
40 TABLET, FILM COATED ORAL NIGHTLY
Qty: 90 TABLET | Refills: 3 | Status: SHIPPED | OUTPATIENT
Start: 2020-02-25 | End: 2021-02-08

## 2020-02-25 RX ORDER — ASPIRIN 81 MG/1
81 TABLET, CHEWABLE ORAL DAILY
Qty: 90 TABLET | Refills: 3 | Status: SHIPPED | OUTPATIENT
Start: 2020-02-25 | End: 2021-03-17

## 2020-03-12 ENCOUNTER — TELEPHONE (OUTPATIENT)
Dept: CARDIOLOGY CLINIC | Age: 40
End: 2020-03-12

## 2020-03-18 ENCOUNTER — TELEPHONE (OUTPATIENT)
Dept: CARDIOLOGY CLINIC | Age: 40
End: 2020-03-18

## 2020-03-18 NOTE — TELEPHONE ENCOUNTER
Pt called and had questions about her heart condition with the covid 19. Pt is staying at home currently. Wants to get a note for her to stay home from work. See previous notes.

## 2020-03-20 NOTE — TELEPHONE ENCOUNTER
Did patient move to [facility] from [place]? I didn't have reason for [facility] consult. I agree with [facility] note, patient does not meet CDC criteria for high risk.

## 2020-03-23 NOTE — TELEPHONE ENCOUNTER
She was referred by Myra Hughes to see Dr. Beto Lafleur. Patient is concerned since she had a STEMI with spontaneous dissection. 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

## 2020-03-31 ENCOUNTER — TELEPHONE (OUTPATIENT)
Dept: CARDIOLOGY CLINIC | Age: 40
End: 2020-03-31

## 2020-03-31 NOTE — TELEPHONE ENCOUNTER
Placed letter in [facility] for pt to contact the office. Need to know where to fax letter to or if pt would like it mailed.

## 2020-03-31 NOTE — TELEPHONE ENCOUNTER
Pt  called and wants a letter staying that Pt has a heart condition and Pt shouldn't be around people with  covid 19 going around and should be off work for a month. See previous notes.

## 2020-05-11 ENCOUNTER — TELEPHONE (OUTPATIENT)
Dept: CARDIOLOGY CLINIC | Age: 40
End: 2020-05-11

## 2020-05-11 NOTE — TELEPHONE ENCOUNTER
We normally try to get out to 1 year past event (so Nov 2021) if causing heavy bleeding, then go ahead and d/c plavix but cont ASA 81mg poqday for now.  Call if continues

## 2020-05-14 NOTE — PROGRESS NOTES
She is not needing the diuretic. Allergies   Allergen Reactions    Codeine      Current Outpatient Medications   Medication Sig Dispense Refill    medroxyPROGESTERone (PROVERA) 10 MG tablet Take 10 mg by mouth daily      metoprolol succinate (TOPROL XL) 25 MG extended release tablet Take 1 tablet by mouth daily 90 tablet 3    lisinopril (PRINIVIL;ZESTRIL) 2.5 MG tablet Take 1 tablet by mouth daily 90 tablet 3    atorvastatin (LIPITOR) 40 MG tablet Take 1 tablet by mouth nightly 90 tablet 3    aspirin 81 MG chewable tablet Take 1 tablet by mouth daily 90 tablet 3    clopidogrel (PLAVIX) 75 MG tablet Take 1 tablet by mouth daily 90 tablet 3    Coenzyme Q10 (COQ10 PO) Take by mouth      furosemide (LASIX) 20 MG tablet Take 1 tablet by mouth daily as needed (for shortness of breath, weight gain, or edema) (Patient not taking: Reported on 5/19/2020) 30 tablet 5    Norgestim-Eth Estrad Triphasic (ORTHO TRI-CYCLEN, 28, PO) Take  by mouth. No current facility-administered medications for this visit.         Past Medical History:   Diagnosis Date    Allergic rhinitis     GERD (gastroesophageal reflux disease)     Headache     Hearing disorder     Ischemic cardiomyopathy 11/2019    due to Spontaneous coronary artery dissection    STEMI (ST elevation myocardial infarction) ([facility] [address] 11/2019    spontaneous coronary dissection LAD    Tinnitus      Past Surgical History:   Procedure Laterality Date    CARDIAC CATHETERIZATION  11/26/2019    KNEE SURGERY       Family History   Problem Relation Age of Onset    Heart Attack Mother     Cancer Father         skin    Cancer Maternal Grandmother         skin     Social History     Socioeconomic History    Marital status:      Spouse name: Not on file    Number of children: Not on file    Years of education: Not on file    Highest education level: Not on file   Occupational History    Not on file   Social Needs    Financial resource strain: Not on Cath  Dominance : Co  LM: normal  LAD: 100% mid occluded just beyond large, arborizing high first diagonal; multiple subsequent injections show intermittent filling of mid to distal vessel and smaller second diagonal revealing significant dissection plane starting just beyond takeoff of first diagonal with suspected intramural hematoma  LCx: normal  RCA: normal      LVEDP: 20 mmHg   LVEF: 40-45%      A0- 113/67 (86)     6 Fr upsized to 8 Fr right femoral sheath     Intraaortic balloon pump inserted to level between aortic knob and jason with ease. 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 Stop Brilinta and Aggrastat. Continue Aspirin and Heparin gtt. Initiate Statin. Continue IABP at 1:1 for increased coronary perfusion. Blood pressure was normal throughout. Supportive management with hopes that natural course will lead to healing of dissection and not require complex, higher risk intervention. Discussed case with CT surgeon on call, Dr. Johnathan Leonard. Formal consult to follow in ICU. Echocardiogram ordered. Reevaluate in morning for addition of Clopidogrel and beta blocker. Hospitalist service notified. Echo: 02/20/2020   Normal left ventricle size and wall thickness. Mildly reduced systolic   function with a visually estimated ejection fraction of 45%. Inferoseptal   hypokinesis. Apical septal, apical anterior and apical inferior akinesis. Normal left ventricular diastolic filling pressure. The mitral valve appears structurally normal.   Mild mitral regurgitation. Labs were reviewed including labs from other hospital systems through [facility]. Cardiac testing was reviewed including echos, nuclear scans, cardiac catheterization, including from other hospital systems through [facility]. Assessment:    1. Systolic CHF, chronic ([address]    2.

## 2020-05-19 ENCOUNTER — TELEPHONE (OUTPATIENT)
Dept: CARDIOLOGY CLINIC | Age: 40
End: 2020-05-19

## 2020-05-19 ENCOUNTER — OFFICE VISIT (OUTPATIENT)
Dept: CARDIOLOGY CLINIC | Age: 40
End: 2020-05-19
Payer: COMMERCIAL

## 2020-05-19 VITALS
HEART RATE: 74 BPM | TEMPERATURE: 96.3 F | DIASTOLIC BLOOD PRESSURE: 64 MMHG | SYSTOLIC BLOOD PRESSURE: 103 MMHG | OXYGEN SATURATION: 99 % | WEIGHT: 173.4 LBS | BODY MASS INDEX: 27.21 KG/M2 | HEIGHT: 67 IN

## 2020-05-19 PROCEDURE — G8419 CALC BMI OUT NRM PARAM NOF/U: HCPCS | Performed by: INTERNAL MEDICINE

## 2020-05-19 PROCEDURE — 1036F TOBACCO NON-USER: CPT | Performed by: INTERNAL MEDICINE

## 2020-05-19 PROCEDURE — 99215 OFFICE O/P EST HI 40 MIN: CPT | Performed by: INTERNAL MEDICINE

## 2020-05-19 PROCEDURE — G8427 DOCREV CUR MEDS BY ELIG CLIN: HCPCS | Performed by: INTERNAL MEDICINE

## 2020-05-19 RX ORDER — MEDROXYPROGESTERONE ACETATE 10 MG/1
10 TABLET ORAL DAILY
COMMUNITY
End: 2020-11-24

## 2020-05-19 NOTE — TELEPHONE ENCOUNTER
Please call pt to schedule her back with DR Prem Lim in October per Dr Juany Oquendo for follow up.  She has seen Dr Prem Lim in the past.

## 2020-07-24 ENCOUNTER — HOSPITAL ENCOUNTER (OUTPATIENT)
Dept: NON INVASIVE DIAGNOSTICS | Age: 40
Discharge: HOME | End: 2020-07-24
Payer: COMMERCIAL

## 2020-07-24 LAB
LV EF: 53 %
LVEF MODALITY: NORMAL

## 2020-07-24 PROCEDURE — 93306 TTE W/DOPPLER COMPLETE: CPT

## 2020-07-27 NOTE — PROGRESS NOTES
[facility]  Advanced CHF/Pulmonary Hypertension   Cardiac Evaluation      Mary Ann Giordano  YOB: 1980    Date of Visit:  7/28/20    Chief Complaint   Patient presents with    Follow-up    Congestive Heart Failure        History of Present Illness:  Mary Ann Giordano is a 41 y.o. female who presents from referral from Jose Lemon CNP for consultation and management of ICM. She has a history of CAD with STEMI and coronary artery dissection, ICM, and NSVT. She was admitted 11/26-11/29/19 with anterior STEMI. 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 Her echo from 11/27/19 showed her EF was 35%. She was started on coreg and lisinopril. Her cardiac event monitor from 02/2020 showed rare PACs and PVCs. Coreg was stopped. She was started on Toprol 25 mg daily. Her repeat echo from 02/20/2020 showed her EF was 45% with inferoseptal hypokinesis. Apical septal, apical anterior and apical inferior akinesis. Mild MR. She reports she has occasional chest tightness/heaviness at work. She works at [employer]. Prior to the dissection, she had SOB and pressure. She reports she has occasional fatigue and lightheadedness. Her  states she does not snore. She denies SOB, palpitations, BLE edema or syncope. She went to see Dr Troy Gibson for possible connective tissue disease which was ruled out. She was advised to have CTA head and chest to look for aneurysms. Her echo from 07/24/20 showed her EF was 50-55% with apical hypokinesis. Today she reports she feels well overall. She is upset regarding weight gain. She reports mild leg edema in her calves. She reports SOB with extreme exertion. She reports facial redness with exertion. She has never used the lasix. She denies CP, palpitations, dizziness or syncope. She reports her energy level is about the same.  She takes a nap due to boredom nearly daily. She reports she is on a new low dose birth control pill. She has not had menses in 2 months. Allergies   Allergen Reactions    Codeine      Current Outpatient Medications   Medication Sig Dispense Refill    NORETHINDRONE PO Take by mouth      metoprolol succinate (TOPROL XL) 25 MG extended release tablet Take 1 tablet by mouth daily 90 tablet 3    lisinopril (PRINIVIL;ZESTRIL) 2.5 MG tablet Take 1 tablet by mouth daily 90 tablet 3    atorvastatin (LIPITOR) 40 MG tablet Take 1 tablet by mouth nightly 90 tablet 3    aspirin 81 MG chewable tablet Take 1 tablet by mouth daily 90 tablet 3    clopidogrel (PLAVIX) 75 MG tablet Take 1 tablet by mouth daily 90 tablet 3    Coenzyme Q10 (COQ10 PO) Take by mouth      medroxyPROGESTERone (PROVERA) 10 MG tablet Take 10 mg by mouth daily      furosemide (LASIX) 20 MG tablet Take 1 tablet by mouth daily as needed (for shortness of breath, weight gain, or edema) (Patient not taking: Reported on 5/19/2020) 30 tablet 5    Norgestim-Eth Estrad Triphasic (ORTHO TRI-CYCLEN, 28, PO) Take  by mouth. No current facility-administered medications for this visit.         Past Medical History:   Diagnosis Date    Allergic rhinitis     GERD (gastroesophageal reflux disease)     Headache     Hearing disorder     Ischemic cardiomyopathy 11/2019    due to Spontaneous coronary artery dissection    STEMI (ST elevation myocardial infarction) ([facility] [address] 11/2019    spontaneous coronary dissection LAD    Tinnitus      Past Surgical History:   Procedure Laterality Date    CARDIAC CATHETERIZATION  11/26/2019    KNEE SURGERY       Family History   Problem Relation Age of Onset    Heart Attack Mother     Cancer Father         skin    Cancer Maternal Grandmother         skin     Social History     Socioeconomic History    Marital status:      Spouse name: Not on file    Number of children: Not on file    Years of education: Not on file  Highest education level: Not on file   Occupational History    Not on file   Social Needs    Financial resource strain: Not on file    Food insecurity     Worry: Not on file     Inability: Not on file    Transportation needs     Medical: Not on file     Non-medical: Not on file   Tobacco Use    Smoking status: Never Smoker    Smokeless tobacco: Never Used   Substance and Sexual Activity    Alcohol use: Yes     Comment: occ    Drug use: Never    Sexual activity: Yes     Partners: Male   Lifestyle    Physical activity     Days per week: Not on file     Minutes per session: Not on file    Stress: Not on file   Relationships    Social connections     Talks on phone: Not on file     Gets together: Not on file     Attends Hindu service: Not on file     Active member of club or organization: Not on file     Attends meetings of clubs or organizations: Not on file     Relationship status: Not on file    Intimate partner violence     Fear of current or ex partner: Not on file     Emotionally abused: Not on file     Physically abused: Not on file     Forced sexual activity: Not on file   Other Topics Concern    Not on file   Social History Narrative    Not on file       Review of Systems:   · Constitutional: there has been no unanticipated weight loss. There's been no change in energy level, sleep pattern, or activity level. · Eyes: No visual changes or diplopia. No scleral icterus. · ENT: No Headaches, hearing loss or vertigo. No mouth sores or sore throat. · Cardiovascular: Reviewed in HPI  · Respiratory: No cough or wheezing, no sputum production. No hematemesis. · Gastrointestinal: No abdominal pain, appetite loss, blood in stools. No change in bowel or bladder habits. · Genitourinary: No dysuria, trouble voiding, or hematuria. · Musculoskeletal:  No gait disturbance, weakness or joint complaints. · Integumentary: No rash or pruritis.   · Neurological: No headache, diplopia, change in muscle strength, numbness or tingling. No change in gait, balance, coordination, mood, affect, memory, mentation, behavior. · Psychiatric: No anxiety, no depression. · Endocrine: No malaise, fatigue or temperature intolerance. No excessive thirst, fluid intake, or urination. No tremor. · Hematologic/Lymphatic: No abnormal bruising or bleeding, blood clots or swollen lymph nodes. · Allergic/Immunologic: No nasal congestion or hives. Physical Examination:    Vitals:    07/28/20 0902   BP: 110/69   Pulse: 95   SpO2: 99%   Weight: 184 lb (83.5 kg)   Height: 5' 7\" (1.702 m)     Body mass index is 28.82 kg/m². Wt Readings from Last 3 Encounters:   07/28/20 184 lb (83.5 kg)   05/19/20 173 lb 6.4 oz (78.7 kg)   02/25/20 172 lb (78 kg)     BP Readings from Last 3 Encounters:   07/28/20 110/69   05/19/20 103/64   02/25/20 90/60            Constitutional and General Appearance:   WD/WN in NAD  HEENT:  NC/AT  MELANIA  No problems with hearing  Skin:  Warm, dry  Respiratory:  · Normal excursion and expansion without use of accessory muscles  · Resp Auscultation: Normal breath sounds without dullness  Cardiovascular:  · The apical impulses not displaced  · Heart tones are crisp and normal  · Cervical veins are not engorged  · The carotid upstroke is normal in amplitude and contour without delay or bruit  · JVP less than 8 cm H2O  RRR with nl S1 and S2 without m,r,g  · Peripheral pulses are symmetrical and full  · There is no clubbing, cyanosis of the extremities.   · No edema  · Femoral Arteries: 2+ and equal  · Pedal Pulses: 2+ and equal   Neck:  · No thyromegaly  Abdomen:  · No masses or tenderness  · Liver/Spleen: No Abnormalities Noted  Neurological/Psychiatric:  · Alert and oriented in all spheres  · Moves all extremities well  · Exhibits normal gait balance and coordination  · No abnormalities of mood, affect, memory, mentation, or behavior are noted    Cardiac cath (johnny) 11/26/19  Procedure: LHC, LVG, Insertion of Intraaortic balloon pump (11/26/19)  Findings:   Left Heart Cath  Dominance : Co  LM: normal  LAD: 100% mid occluded just beyond large, arborizing high first diagonal; multiple subsequent injections show intermittent filling of mid to distal vessel and smaller second diagonal revealing significant dissection plane starting just beyond takeoff of first diagonal with suspected intramural hematoma  LCx: normal  RCA: normal      LVEDP: 20 mmHg   LVEF: 40-45%      A0- 113/67 (86)     6 Fr upsized to 8 Fr right femoral sheath     Intraaortic balloon pump inserted to level between aortic knob and jason with ease. 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 Stop Brilinta and Aggrastat. Continue Aspirin and Heparin gtt. Initiate Statin. Continue IABP at 1:1 for increased coronary perfusion. Blood pressure was normal throughout. Supportive management with hopes that natural course will lead to healing of dissection and not require complex, higher risk intervention. Discussed case with CT surgeon on call, Dr. Muna Kulkarni. Formal consult to follow in ICU. Echocardiogram ordered. Reevaluate in morning for addition of Clopidogrel and beta blocker. Hospitalist service notified. Echo: 02/20/2020   Normal left ventricle size and wall thickness. Mildly reduced systolic   function with a visually estimated ejection fraction of 45%. Inferoseptal   hypokinesis. Apical septal, apical anterior and apical inferior akinesis. Normal left ventricular diastolic filling pressure. The mitral valve appears structurally normal.   Mild mitral regurgitation. Echo: 07/24/20  Summary   Left ventricular systolic function is low normal with a visually estimated   ejection fraction of 50-55%. EF calculated by Greenberg's method at 54%.    The left ventricle is normal in size with normal wall thickness. Apical   hypokinesis. Normal left ventricular diastolic function. No significant valvular disease. Labs were reviewed including labs from other hospital systems through [facility]. Cardiac testing was reviewed including echos, nuclear scans, cardiac catheterization, including from other hospital systems through [facility]. Assessment:    1. Systolic CHF, chronic ([address]    2. Coronary artery dissection    3. Ischemic cardiomyopathy    4. Other fatigue          Plan:  1. Recommend taking furosemide as needed for leg edema  2. CBC, CMP, BNP, and TSH  3. Follow up in 2-3 months      QUALITY MEASURES  1. Tobacco Cessation Counseling: NA  2. Retake of BP if >140/90:   NA  3. Documentation to PCP/referring for new patient:  Sent to PCP at close of office visit  4. CAD patient on anti-platelet: Yes  5. CAD patient on STATIN therapy:  Yes  6. Patient with CHF and aFib on anticoagulation:  NA     Scribe's attestation: This note was scribed in the presence of Rashawn Jarrett M.D. By Tyrone Wagner RN     The scribe's documentation has been prepared under my direction and personally reviewed by me in its entirety. I confirm that the note above accurately reflects all work, treatment, procedures, and medical decision making performed by me. I appreciate the opportunity of cooperating in the care of this patient.     Rashawn Jarrett M.D., [facility]

## 2020-07-28 ENCOUNTER — HOSPITAL ENCOUNTER (OUTPATIENT)
Age: 40
Discharge: HOME | End: 2020-07-28
Payer: COMMERCIAL

## 2020-07-28 ENCOUNTER — OFFICE VISIT (OUTPATIENT)
Dept: CARDIOLOGY CLINIC | Age: 40
End: 2020-07-28
Payer: COMMERCIAL

## 2020-07-28 VITALS
SYSTOLIC BLOOD PRESSURE: 110 MMHG | HEIGHT: 67 IN | HEART RATE: 95 BPM | WEIGHT: 184 LBS | DIASTOLIC BLOOD PRESSURE: 69 MMHG | BODY MASS INDEX: 28.88 KG/M2 | OXYGEN SATURATION: 99 %

## 2020-07-28 LAB
A/G RATIO: 1.5 (ref 1.1–2.2)
ALBUMIN SERPL-MCNC: 4.4 G/DL (ref 3.4–5)
ALP BLD-CCNC: 81 U/L (ref 40–129)
ALT SERPL-CCNC: 15 U/L (ref 10–40)
ANION GAP SERPL CALCULATED.3IONS-SCNC: 15 MMOL/L (ref 3–16)
AST SERPL-CCNC: 13 U/L (ref 15–37)
BASOPHILS ABSOLUTE: 0.1 K/UL (ref 0–0.2)
BASOPHILS RELATIVE PERCENT: 0.9 %
BILIRUB SERPL-MCNC: 0.3 MG/DL (ref 0–1)
BUN BLDV-MCNC: 15 MG/DL (ref 7–20)
CALCIUM SERPL-MCNC: 9.3 MG/DL (ref 8.3–10.6)
CHLORIDE BLD-SCNC: 104 MMOL/L (ref 99–110)
CO2: 23 MMOL/L (ref 21–32)
CREAT SERPL-MCNC: 0.7 MG/DL (ref 0.6–1.1)
EOSINOPHILS ABSOLUTE: 0 K/UL (ref 0–0.6)
EOSINOPHILS RELATIVE PERCENT: 0.5 %
GFR AFRICAN AMERICAN: >60
GFR NON-AFRICAN AMERICAN: >60
GLOBULIN: 3 G/DL
GLUCOSE BLD-MCNC: 105 MG/DL (ref 70–99)
HCT VFR BLD CALC: 42.5 % (ref 36–48)
HEMOGLOBIN: 14.4 G/DL (ref 12–16)
LYMPHOCYTES ABSOLUTE: 1.8 K/UL (ref 1–5.1)
LYMPHOCYTES RELATIVE PERCENT: 28.8 %
MCH RBC QN AUTO: 31.4 PG (ref 26–34)
MCHC RBC AUTO-ENTMCNC: 33.9 G/DL (ref 31–36)
MCV RBC AUTO: 92.6 FL (ref 80–100)
MONOCYTES ABSOLUTE: 0.5 K/UL (ref 0–1.3)
MONOCYTES RELATIVE PERCENT: 8.4 %
NEUTROPHILS ABSOLUTE: 3.9 K/UL (ref 1.7–7.7)
NEUTROPHILS RELATIVE PERCENT: 61.4 %
PDW BLD-RTO: 12.8 % (ref 12.4–15.4)
PLATELET # BLD: 298 K/UL (ref 135–450)
PMV BLD AUTO: 8 FL (ref 5–10.5)
POTASSIUM SERPL-SCNC: 4.4 MMOL/L (ref 3.5–5.1)
PRO-BNP: 115 PG/ML (ref 0–124)
RBC # BLD: 4.59 M/UL (ref 4–5.2)
SODIUM BLD-SCNC: 142 MMOL/L (ref 136–145)
TOTAL PROTEIN: 7.4 G/DL (ref 6.4–8.2)
TSH REFLEX: 1.88 UIU/ML (ref 0.27–4.2)
WBC # BLD: 6.3 K/UL (ref 4–11)

## 2020-07-28 PROCEDURE — 80053 COMPREHEN METABOLIC PANEL: CPT

## 2020-07-28 PROCEDURE — G8419 CALC BMI OUT NRM PARAM NOF/U: HCPCS | Performed by: INTERNAL MEDICINE

## 2020-07-28 PROCEDURE — 1036F TOBACCO NON-USER: CPT | Performed by: INTERNAL MEDICINE

## 2020-07-28 PROCEDURE — 36415 COLL VENOUS BLD VENIPUNCTURE: CPT

## 2020-07-28 PROCEDURE — 99214 OFFICE O/P EST MOD 30 MIN: CPT | Performed by: INTERNAL MEDICINE

## 2020-07-28 PROCEDURE — 84443 ASSAY THYROID STIM HORMONE: CPT

## 2020-07-28 PROCEDURE — G8427 DOCREV CUR MEDS BY ELIG CLIN: HCPCS | Performed by: INTERNAL MEDICINE

## 2020-07-28 PROCEDURE — 83880 ASSAY OF NATRIURETIC PEPTIDE: CPT

## 2020-07-28 PROCEDURE — 85025 COMPLETE CBC W/AUTO DIFF WBC: CPT

## 2020-10-05 NOTE — PROGRESS NOTES
Efrain Rae  [birth year]65 y.o. female   [id number]    HEARING [address] [facility] Ernestine Baxter was seen today, 10/9/2020, for a hearing aid check appointment. PROCEDURES:     Otoscopy revealed: Clear ear canals bilaterally    Patient reports overall consistent use and benefits with aids. Hearing aids were cleaned and checked. A listening check revealed good function of the devices. Microphone and  ports were suctioned, domes and wax traps were changes, and devices completed a desiccant cycle through [employer]. Post-cleaning listening check revealed good function of the devices. Connected devices to fitting software. Datalogging revealed 8 hours of use per day. PATIENT EDUCATION:     - Verbally and visually reviewed care and maintenance of devices. - Reviewed importance of consistent use of devices. Information was shared verbally with patient during appointment. RECOMMENDATIONS:     - Continue consistent hearing aid use  - Return for hearing aid checks as needed  - Retest hearing as medically indicated and/or sooner if a change in hearing is noted. - Contact audiologist with questions/concerns as needed. **No charge for today's appointment; patient under service warranty through 12/19/2020.     Lamar Gallardo  Audiologist

## 2020-10-09 ENCOUNTER — PROCEDURE VISIT (OUTPATIENT)
Dept: AUDIOLOGY | Age: 40
End: 2020-10-09

## 2020-10-09 VITALS — TEMPERATURE: 98.2 F

## 2020-10-09 PROCEDURE — 99999 PR OFFICE/OUTPT VISIT,PROCEDURE ONLY: CPT | Performed by: AUDIOLOGIST

## 2020-11-23 NOTE — PROGRESS NOTES
[facility]  Advanced CHF/Pulmonary Hypertension   Cardiac Evaluation      Chris Williamson  YOB: 1980    Date of Visit:  11/24/20    Chief Complaint   Patient presents with    Follow-up    Congestive Heart Failure         History of Present Illness:  Chris Williamson is a 41 y.o. female who presents from referral from [facility] for consultation and management of ICM. She has a history of CAD with STEMI and coronary artery dissection, ICM, and NSVT. She was admitted 11/26-11/29/19 with anterior STEMI. 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 Her echo from 11/27/19 showed her EF was 35%. She was started on coreg and lisinopril. Her cardiac event monitor from 02/2020 showed rare PACs and PVCs. Coreg was stopped. She was started on Toprol 25 mg daily. Her repeat echo from 02/20/2020 showed her EF was 45% with inferoseptal hypokinesis. Apical septal, apical anterior and apical inferior akinesis. Mild MR. She reports she has occasional chest tightness/heaviness at work. She works at [employer]. Prior to the dissection, she had SOB and pressure. She reports she has occasional fatigue and lightheadedness. Her  states she does not snore. She denies SOB, palpitations, BLE edema or syncope. She went to see Dr Mahnaz Braxton for possible connective tissue disease which was ruled out. She was advised to have CTA head and chest to look for aneurysms. Her echo from 07/24/20 showed her EF was 50-55% with apical hypokinesis. Today she reports she has weird sensations in her chest. She reports she is off balanced and feeling falling to the left. She reports 3 episodes at work and the other symptoms occur with standing or cooking. She reports a gagging sensation and a feeling of her throat being constricted.  She reports a chest heaviness that feels like something is pressing on her chest. She reports the symptoms are in the AM after waking. She reports she has the symptoms at rest. She reports the chest heaviness lasts about 5 mins. She takes lasix about once a week which started in 08/2020. She reports ongoing BLE edema without change. She denies palpitations, increased fatigue or syncope. Allergies   Allergen Reactions    Codeine      Current Outpatient Medications   Medication Sig Dispense Refill    NORETHINDRONE PO Take by mouth      metoprolol succinate (TOPROL XL) 25 MG extended release tablet Take 1 tablet by mouth daily 90 tablet 3    lisinopril (PRINIVIL;ZESTRIL) 2.5 MG tablet Take 1 tablet by mouth daily 90 tablet 3    atorvastatin (LIPITOR) 40 MG tablet Take 1 tablet by mouth nightly 90 tablet 3    aspirin 81 MG chewable tablet Take 1 tablet by mouth daily 90 tablet 3    clopidogrel (PLAVIX) 75 MG tablet Take 1 tablet by mouth daily 90 tablet 3    furosemide (LASIX) 20 MG tablet Take 1 tablet by mouth daily as needed (for shortness of breath, weight gain, or edema) (Patient taking differently: Take 20 mg by mouth 1 tab once weekly) 30 tablet 5    Coenzyme Q10 (COQ10 PO) Take by mouth       No current facility-administered medications for this visit.         Past Medical History:   Diagnosis Date    Allergic rhinitis     GERD (gastroesophageal reflux disease)     Headache     Hearing disorder     Ischemic cardiomyopathy 11/2019    due to Spontaneous coronary artery dissection    STEMI (ST elevation myocardial infarction) ([facility] [address] 11/2019    spontaneous coronary dissection LAD    Tinnitus      Past Surgical History:   Procedure Laterality Date    CARDIAC CATHETERIZATION  11/26/2019    KNEE SURGERY       Family History   Problem Relation Age of Onset    Heart Attack Mother     Cancer Father         skin    Cancer Maternal Grandmother         skin     Social History     Socioeconomic History    Marital status:      Spouse name: Not on file    Number of children: Not on file    Years of education: Not on file    Highest education level: Not on file   Occupational History    Not on file   Social Needs    Financial resource strain: Not on file    Food insecurity     Worry: Not on file     Inability: Not on file    Transportation needs     Medical: Not on file     Non-medical: Not on file   Tobacco Use    Smoking status: Never Smoker    Smokeless tobacco: Never Used   Substance and Sexual Activity    Alcohol use: Yes     Comment: occ    Drug use: Never    Sexual activity: Yes     Partners: Male   Lifestyle    Physical activity     Days per week: Not on file     Minutes per session: Not on file    Stress: Not on file   Relationships    Social connections     Talks on phone: Not on file     Gets together: Not on file     Attends [employer] service: Not on file     Active member of club or organization: Not on file     Attends meetings of clubs or organizations: Not on file     Relationship status: Not on file    Intimate partner violence     Fear of current or ex partner: Not on file     Emotionally abused: Not on file     Physically abused: Not on file     Forced sexual activity: Not on file   Other Topics Concern    Not on file   Social History Narrative    Not on file       Review of Systems:   · Constitutional: there has been no unanticipated weight loss. There's been no change in energy level, sleep pattern, or activity level. · Eyes: No visual changes or diplopia. No scleral icterus. · ENT: No Headaches, hearing loss or vertigo. No mouth sores or sore throat. · Cardiovascular: Reviewed in HPI  · Respiratory: No cough or wheezing, no sputum production. No hematemesis. · Gastrointestinal: No abdominal pain, appetite loss, blood in stools. No change in bowel or bladder habits. · Genitourinary: No dysuria, trouble voiding, or hematuria.   · Musculoskeletal:  No gait disturbance, weakness or joint complaints. · Integumentary: No rash or pruritis. · Neurological: No headache, diplopia, change in muscle strength, numbness or tingling. No change in gait, balance, coordination, mood, affect, memory, mentation, behavior. · Psychiatric: No anxiety, no depression. · Endocrine: No malaise, fatigue or temperature intolerance. No excessive thirst, fluid intake, or urination. No tremor. · Hematologic/Lymphatic: No abnormal bruising or bleeding, blood clots or swollen lymph nodes. · Allergic/Immunologic: No nasal congestion or hives. Physical Examination:    Vitals:    11/24/20 0822   BP: 110/64   Pulse: 78   SpO2: 98%   Weight: 186 lb 6.4 oz (84.6 kg)   Height: 5' 7\" (1.702 m)     Body mass index is 29.19 kg/m². Wt Readings from Last 3 Encounters:   11/24/20 186 lb 6.4 oz (84.6 kg)   11/03/20 187 lb (84.8 kg)   07/28/20 184 lb (83.5 kg)     BP Readings from Last 3 Encounters:   11/24/20 110/64   11/03/20 98/60   07/28/20 110/69        Constitutional and General Appearance:   WD/WN in NAD  HEENT:  NC/AT  MELANIA  No problems with hearing  Skin:  Warm, dry  Respiratory:  · Normal excursion and expansion without use of accessory muscles  · Resp Auscultation: Normal breath sounds without dullness  Cardiovascular:  · The apical impulses not displaced  · Heart tones are crisp and normal  · Cervical veins are not engorged  · The carotid upstroke is normal in amplitude and contour without delay or bruit  · JVP less than 8 cm H2O  RRR with nl S1 and S2 without m,r,g  · Peripheral pulses are symmetrical and full  · There is no clubbing, cyanosis of the extremities.   · No edema  · Femoral Arteries: 2+ and equal  · Pedal Pulses: 2+ and equal   Neck:  · No thyromegaly  Abdomen:  · No masses or tenderness  · Liver/Spleen: No Abnormalities Noted  Neurological/Psychiatric:  · Alert and oriented in all spheres  · Moves all extremities well  · Exhibits normal gait balance and coordination  · No abnormalities of mood, affect, 50-55%. EF calculated by Greenberg's method at 54%. The left ventricle is normal in size with normal wall thickness. Apical   hypokinesis. Normal left ventricular diastolic function. No significant valvular disease. Labs were reviewed including labs from other hospital systems through [facility]. Cardiac testing was reviewed including echos, nuclear scans, cardiac catheterization, including from other hospital systems through [facility]. Assessment:    1. Systolic CHF, chronic ([address]    2. Coronary artery dissection    3. Ischemic cardiomyopathy    4. Mixed hyperlipidemia    5. Vitamin D deficiency          Plan:  1. If chest heaviness continues, will schedule stress myoview. 2. Lipids, CBC, CMP, BNP, and Vit D  3. Follow up in 6 months        Scribe's attestation: This note was scribed in the presence of Tomasa Cox M.D. By Stevenson Camacho RN     The scribe's documentation has been prepared under my direction and personally reviewed by me in its entirety. I confirm that the note above accurately reflects all work, treatment, procedures, and medical decision making performed by me. I appreciate the opportunity of cooperating in the care of this patient.     Tomasa Cox M.D., [facility]

## 2020-11-24 ENCOUNTER — OFFICE VISIT (OUTPATIENT)
Dept: CARDIOLOGY CLINIC | Age: 40
End: 2020-11-24
Payer: COMMERCIAL

## 2020-11-24 VITALS
HEIGHT: 67 IN | SYSTOLIC BLOOD PRESSURE: 110 MMHG | OXYGEN SATURATION: 98 % | DIASTOLIC BLOOD PRESSURE: 64 MMHG | WEIGHT: 186.4 LBS | HEART RATE: 78 BPM | BODY MASS INDEX: 29.26 KG/M2

## 2020-11-24 PROCEDURE — G8484 FLU IMMUNIZE NO ADMIN: HCPCS | Performed by: INTERNAL MEDICINE

## 2020-11-24 PROCEDURE — 1036F TOBACCO NON-USER: CPT | Performed by: INTERNAL MEDICINE

## 2020-11-24 PROCEDURE — G8419 CALC BMI OUT NRM PARAM NOF/U: HCPCS | Performed by: INTERNAL MEDICINE

## 2020-11-24 PROCEDURE — G8427 DOCREV CUR MEDS BY ELIG CLIN: HCPCS | Performed by: INTERNAL MEDICINE

## 2020-11-24 PROCEDURE — 99214 OFFICE O/P EST MOD 30 MIN: CPT | Performed by: INTERNAL MEDICINE

## 2020-11-24 NOTE — PATIENT INSTRUCTIONS
Plan:  1. If chest heaviness continues, will schedule stress myoview. 2. Lipids, CBC, CMP, BNP, and Vit D  3.  Follow up in 6 months

## 2020-11-29 NOTE — PROGRESS NOTES
Chaim Bellamy  [birth year]71 y.o. female   [id number]    HEARING [facility] was seen today, 12/3/2020, for a hearing aid check appointment. PROCEDURES:     Otoscopy revealed: Clear ear canals bilaterally    Patient reported overall benefit and satisfaction with aids. She notes improved sound quality with less feedback and comfort with medium power domes changed from small power at last appointment, but noted some pressure from larger domes. Cut sides of domes to assist with pressure. Patient reported comfort with slits in sides of domes. Hearing aids were cleaned and checked. A listening check revealed good function of the devices. Microphone and  ports were suctioned, domes and wax traps were changed, and devices completed a desiccant cycle through [email or web address]. Post-cleaning listening check revealed good function of the devices. Gave patient domes with slits if cleaning is needed. Connected devices to fitting software. Data logging revealed 11.2 hours of use per day. PATIENT EDUCATION:     Tricia El and karson reviewed care and maintenance of devices.   - Reviewed importance of consistent use of devices.      Information was shared verbally with patient during appointment. RECOMMENDATIONS:     - Continue consistent hearing aid use  - Return for hearing aid checks as needed  - Retest hearing as medically indicated and/or sooner if a change in hearing is noted. - Contact audiologist with questions/concerns as needed. **No charge for today's appointment; patient under service warranty through 12/19/2020. Explained to patient this is last appointment included in service warranty and there will be a charge for next office visit. Patient reported understanding.      Lamar Johnson  Audiologist

## 2020-12-03 ENCOUNTER — PROCEDURE VISIT (OUTPATIENT)
Dept: AUDIOLOGY | Age: 40
End: 2020-12-03

## 2020-12-03 VITALS — TEMPERATURE: 97.3 F

## 2020-12-03 PROCEDURE — 99999 PR OFFICE/OUTPT VISIT,PROCEDURE ONLY: CPT | Performed by: AUDIOLOGIST

## 2021-01-13 NOTE — PROGRESS NOTES
[address] [facility]   Cardiovascular Evaluation    PATIENT: Ibeth Romo  DATE: 2021  MRN: [id number]  CSN: [id number]  : 1980      Primary Care Doctor: No primary care provider on file. Reason for evaluation:   1 Year Follow Up (no cardiac complaints )      Subjective:   History of present illness on initial date of evaluation:   Ibeth Romo is a 41 y.o. patient who presented for hospital follow up. She presented to the hospital on 19 with complaints of chest pain. [facility] performed. Acute Anterior STEMI with Spontaneous Coronary Artery Dissection. OV 20 she presented with her  to discuss her testing and medications. She reported feeling ok. She stated she does feel a fluttering in her chest.  She stated more so when she comes home from work. She denied chest pain, sob, dizziness or syncope. Today she reports feeling ok. She states she has had off and on chest pressure. She states she saw Dr. Romayne Dapper in November and since then the chest pressure has continued. She states it can be with or without exertion. She states it will last a couple minutes and then goes away on it's own. She does not believe it has worsened. She states she feels palpitations occasionally. She denies any fever or Covid like symptoms. She denies SOB, dizziness or syncope. She states she does have BLE edema at times.          Patient Active Problem List   Diagnosis    Acute ST elevation myocardial infarction (STEMI) ([facility] [address]    Coronary artery dissection    Hypomagnesemia    Bilateral hearing loss    Ischemic cardiomyopathy    NS[facility]onsustained ventricular tachycardia) ([facility])    Sensorineural hearing loss, bilateral    Palpitations    Other chest pain         Past Medical History:   has a past medical history of Allergic rhinitis, GERD (gastroesophageal reflux disease), Headache, Hearing disorder, Ischemic cardiomyopathy, STEMI (ST elevation myocardial infarction) ([address] and Tinnitus. Surgical History:   has a past surgical history that includes knee surgery and Cardiac catheterization (11/26/2019). Social History:   reports that she has never smoked. She has never used smokeless tobacco. She reports current alcohol use. She reports that she does not use drugs. Family History:  No evidence for sudden cardiac death or premature CAD    Home Medications:  Reviewed and are listed in nursing record. and/or listed below  Current Outpatient Medications   Medication Sig Dispense Refill    NORETHINDRONE PO Take by mouth      metoprolol succinate (TOPROL XL) 25 MG extended release tablet Take 1 tablet by mouth daily 90 tablet 3    lisinopril (PRINIVIL;ZESTRIL) 2.5 MG tablet Take 1 tablet by mouth daily 90 tablet 3    atorvastatin (LIPITOR) 40 MG tablet Take 1 tablet by mouth nightly 90 tablet 3    aspirin 81 MG chewable tablet Take 1 tablet by mouth daily 90 tablet 3    clopidogrel (PLAVIX) 75 MG tablet Take 1 tablet by mouth daily 90 tablet 3    furosemide (LASIX) 20 MG tablet Take 1 tablet by mouth daily as needed (for shortness of breath, weight gain, or edema) (Patient taking differently: Take 20 mg by mouth 1 tab once weekly) 30 tablet 5    Coenzyme Q10 (COQ10 PO) Take by mouth       No current facility-administered medications for this visit. Allergies:  Codeine     Review of Systems:   A 14 point review of symptoms completed. Pertinent positives identified in the HPI, all other review of symptoms negative as below.     Objective:   PHYSICAL EXAM:    Vitals:    01/14/21 0922   BP: 100/68   Pulse: 87   SpO2: 99%    Weight: 187 lb (84.8 kg)     Wt Readings from Last 3 Encounters:   01/14/21 187 lb (84.8 kg)   11/24/20 186 lb 6.4 oz (84.6 kg)   11/03/20 187 lb (84.8 kg)         General Appearance:  Alert, cooperative, no distress, appears stated age   Head:  Normocephalic, atraumatic   Eyes:  PERRL, conjunctiva/corneas clear   Nose: Nares normal, no drainage or sinus tenderness   Throat: Lips, mucosa, and tongue normal   Neck: Supple, symmetrical, trachea midline, NL thyroid no carotid bruit or JVD   Lungs:   CTAB, respirations unlabored   Chest Wall:  No tenderness or deformity   Heart:  Regular rhythm and normal rate; S1, S2 are normal;   no murmur noted; no rub or gallop   Abdomen:   Soft, non-tender, +BS x 4, no masses, no organomegaly   Extremities: Extremities normal, atraumatic, no cyanosis or edema   Pulses: 2+ and symmetric   Skin: Skin color, texture, turgor normal, no rashes or lesions   Pysch: Normal mood and affect   Neurologic: Normal gross motor and sensory exam.         LABS   CBC:      Lab Results   Component Value Date    WBC 6.3 07/28/2020    RBC 4.59 07/28/2020    HGB 14.4 07/28/2020    HCT 42.5 07/28/2020    MCV 92.6 07/28/2020    RDW 12.8 07/28/2020     07/28/2020     CMP:  Lab Results   Component Value Date     07/28/2020    K 4.4 07/28/2020    K 3.9 11/27/2019     07/28/2020    CO2 23 07/28/2020    BUN 15 07/28/2020    CREATININE 0.7 07/28/2020    GFRAA >60 07/28/2020    GFRAA >60 05/25/2011    AGRATIO 1.5 07/28/2020    LABGLOM >60 07/28/2020    GLUCOSE 105 07/28/2020    PROT 7.4 07/28/2020    PROT 6.7 05/25/2011    CALCIUM 9.3 07/28/2020    BILITOT 0.3 07/28/2020    ALKPHOS 81 07/28/2020    AST 13 07/28/2020    ALT 15 07/28/2020     PT/INR:   No results found for: PTINR  Liver:  No components found for: CHLPL  Lab Results   Component Value Date    ALT 15 07/28/2020    AST 13 (L) 07/28/2020    ALKPHOS 81 07/28/2020    BILITOT 0.3 07/28/2020     No results found for: LABA1C  Lipids:         Lab Results   Component Value Date    TRIG 67 02/25/2020    TRIG 76 11/27/2019            Lab Results   Component Value Date    HDL 39 (L) 02/25/2020    HDL 50 11/27/2019            Lab Results   Component Value Date    LDLCALC 62 02/25/2020    LDLCALC 94 11/27/2019            Lab Results   Component Value Date    LABVLDL 13 02/25/2020    LABVLDL 15 11/27/2019         CARDIAC DATA   EKG:    ECHO 7/24/20  Summary   Left ventricular systolic function is low normal with a visually estimated   ejection fraction of 50-55%. EF calculated by Greenberg's method at 54%. The left ventricle is normal in size with normal wall thickness. Apical   hypokinesis. Normal left ventricular diastolic function. No significant valvular disease. [address] [place] [address]: 11/26/19  Summary  The left ventricular systolic function is moderately reduced with an  ejection fraction of 35 -40 %. There is severe hypokinesis of the anterior and apical walls consistent with  infarction within the territory of the left anterior descending artery. Grade I diastolic dysfunction with normal filling pressure. Mild mitral regurgitation. STRESS TEST:    CARDIAC CATH:    2 WEEK CARDIAC MONITOR 1/29/20-2/7/20  Rare PVC/PAC    VASCULAR/OTHER IMAGING:      Assessment and Plan   Marilu Perez is a 41 y.o. female who presents today for the following problems:        1. Hx of STEMI: trop peaked at 5.1  2. Spontanous coronary dissection:              - conservative management    - Evaluated by rheumatology without conclusive diagnosis/vasculitis   - Genetic test showing VUS (variant unknown significance) only, no obvious dx  3. Ischemic cardiomyopathy: Improved    LVEF 35-40%-->50-55%  4. Hearing loss: present since birth per pt   -Patient now wearing hearing aids and doing very well  5. NSVT: improved on toprol  6. CP: Somewhat atypical      MD PLAN  1. Patient is doing well overall but complains of atypical chest pain    -Given coronary dissection we will get GXT Myoview to evaluate a residual obstructive lesion  2. If normal okay to DC Plavix and continue aspirin. 3.  We will also update CMP, CBC, lipids, sed rate and CRP  4.  Cont coreg and lisinopril              - educated on need for Birth control on lisinopril   -We will speak to OB/GYN when she is off her Plavix Patient Active Problem List   Diagnosis    Acute ST [facility] ([facility]I) ([facility] [address]    Coronary artery dissection    Hypomagnesemia    Bilateral hearing loss    Ischemic cardiomyopathy    [facility]onsust[facility] tachycardia) ([facility])    Sensorineural hearing loss, bilateral    Palpitations    Other chest pain       Patient Plan:  1. Labs  - lipids, vit d, sedrate, crp, cmp, cbc  2. Stress test to risk stratify   3. We will call you with the results   ~discontinuation of medication will be discussed at that time  4. Follow up in 6 months         It is a pleasure to assist in the care of Sweetie Grey. Please call with any questions. This note was scribed in the presence of Bo Patel MD by Sergio Avila RN.        Aleks López MD, personally performed the services described in this documentation as scribed by the above signed scribe in my presence, and it is both accurate and complete to the best of our ability and knowledge. I agree with the details independently gathered by my clinical support staff, while the remaining scribed note accurately describes my personal service to the patient. The above RN is working as a scribe for and in the presence of myself . Working as a scribe, the RN may have prepopulated components of this note with my historical intellectual property under my direct supervision. Any additions to this intellectual property were performed at my direction. Furthermore, the content and accuracy of this note have been reviewed by me to the best of my ability.          Chris Regan MD, [address] [facility] Cardiologist  [address]  [phone] [facility]  [phone] [address] [place] [address]

## 2021-01-14 ENCOUNTER — OFFICE VISIT (OUTPATIENT)
Dept: CARDIOLOGY CLINIC | Age: 41
End: 2021-01-14
Payer: COMMERCIAL

## 2021-01-14 VITALS
SYSTOLIC BLOOD PRESSURE: 100 MMHG | HEIGHT: 67 IN | OXYGEN SATURATION: 99 % | HEART RATE: 87 BPM | DIASTOLIC BLOOD PRESSURE: 68 MMHG | WEIGHT: 187 LBS | BODY MASS INDEX: 29.35 KG/M2

## 2021-01-14 DIAGNOSIS — E55.9 VITAMIN D DEFICIENCY: ICD-10-CM

## 2021-01-14 DIAGNOSIS — I25.42 CORONARY ARTERY DISSECTION: ICD-10-CM

## 2021-01-14 DIAGNOSIS — R07.89 OTHER CHEST PAIN: ICD-10-CM

## 2021-01-14 DIAGNOSIS — I47.29 NSVT (NONSUSTAINED VENTRICULAR TACHYCARDIA): ICD-10-CM

## 2021-01-14 DIAGNOSIS — R07.9 CHEST PAIN, UNSPECIFIED TYPE: Primary | ICD-10-CM

## 2021-01-14 DIAGNOSIS — I25.5 ISCHEMIC CARDIOMYOPATHY: ICD-10-CM

## 2021-01-14 PROCEDURE — 1036F TOBACCO NON-USER: CPT | Performed by: INTERNAL MEDICINE

## 2021-01-14 PROCEDURE — 99214 OFFICE O/P EST MOD 30 MIN: CPT | Performed by: INTERNAL MEDICINE

## 2021-01-14 PROCEDURE — G8419 CALC BMI OUT NRM PARAM NOF/U: HCPCS | Performed by: INTERNAL MEDICINE

## 2021-01-14 PROCEDURE — G8427 DOCREV CUR MEDS BY ELIG CLIN: HCPCS | Performed by: INTERNAL MEDICINE

## 2021-01-14 PROCEDURE — G8484 FLU IMMUNIZE NO ADMIN: HCPCS | Performed by: INTERNAL MEDICINE

## 2021-01-14 NOTE — PATIENT INSTRUCTIONS
Patient Plan:  1. Labs  - lipids, vit d, sedrate, crp, cmp, cbc  2. Stress test to risk stratify   3. We will call you with the results   ~discontinuation of medication will be discussed at that time  4.  Follow up in 6 months     Avoid \"D\" in cough medications or sinus medications  (Decongestant)

## 2021-01-26 ENCOUNTER — HOSPITAL ENCOUNTER (OUTPATIENT)
Dept: CARDIOLOGY | Age: 41
Discharge: HOME | End: 2021-01-26
Payer: COMMERCIAL

## 2021-01-26 DIAGNOSIS — I25.42 CORONARY ARTERY DISSECTION: ICD-10-CM

## 2021-01-26 DIAGNOSIS — R07.89 OTHER CHEST PAIN: ICD-10-CM

## 2021-01-26 LAB
LV EF: 58 %
LVEF MODALITY: NORMAL

## 2021-01-26 PROCEDURE — 78452 HT MUSCLE IMAGE SPECT MULT: CPT

## 2021-01-26 PROCEDURE — 93017 CV STRESS TEST TRACING ONLY: CPT

## 2021-01-26 PROCEDURE — 3430000000 HC RX DIAGNOSTIC RADIOPHARMACEUTICAL: Performed by: INTERNAL MEDICINE

## 2021-01-26 PROCEDURE — A9502 TC99M TETROFOSMIN: HCPCS | Performed by: INTERNAL MEDICINE

## 2021-01-26 RX ADMIN — TETROFOSMIN 11.7 MILLICURIE: 1.38 INJECTION, POWDER, LYOPHILIZED, FOR SOLUTION INTRAVENOUS at 11:20

## 2021-01-26 RX ADMIN — TETROFOSMIN 32.9 MILLICURIE: 1.38 INJECTION, POWDER, LYOPHILIZED, FOR SOLUTION INTRAVENOUS at 13:47

## 2021-01-28 ENCOUNTER — TELEPHONE (OUTPATIENT)
Dept: CARDIOLOGY CLINIC | Age: 41
End: 2021-01-28

## 2021-01-28 DIAGNOSIS — R07.2 PRECORDIAL PAIN: Primary | ICD-10-CM

## 2021-01-28 NOTE — TELEPHONE ENCOUNTER
----- Message from Sharmila Coppola MD sent at 1/27/2021  3:19 PM EST -----  Please let patient know that stress test shows evidence of her prior heart attack but no new issues. Given the lack of ischemia I like to try L-arginine 500 mg twice daily for 1 week and then increase to 1000 mg twice daily for 1 to 2 months. See if that helps if no effect we will stop it in the future.   Given the stress test does not show ischemia do not need to return to Cath Lab at this time

## 2021-02-08 DIAGNOSIS — I50.22 SYSTOLIC CHF, CHRONIC (HCC): ICD-10-CM

## 2021-02-08 RX ORDER — ATORVASTATIN CALCIUM 40 MG/1
TABLET, FILM COATED ORAL
Qty: 90 TABLET | Refills: 3 | Status: SHIPPED | OUTPATIENT
Start: 2021-02-08 | End: 2022-02-14

## 2021-02-08 RX ORDER — LISINOPRIL 2.5 MG/1
TABLET ORAL
Qty: 90 TABLET | Refills: 3 | Status: SHIPPED | OUTPATIENT
Start: 2021-02-08 | End: 2022-02-11

## 2021-02-25 DIAGNOSIS — I50.22 SYSTOLIC CHF, CHRONIC (HCC): ICD-10-CM

## 2021-02-25 NOTE — TELEPHONE ENCOUNTER
Medication Refill    Medication needing refilled: metoprolol succinate (TOPROL XL) 25 MG extended release tablet        How are you taking this medication (QD, BID, TID, QID, PRN): once daily    30 or 90 day supply called in: 90    When will you run out of your medication: unknown    Which Pharmacy are we sending the medication to?:  [facility] [id number], [address]   [address] [place] [address], [facility]

## 2021-02-26 DIAGNOSIS — I50.22 SYSTOLIC CHF, CHRONIC (HCC): ICD-10-CM

## 2021-02-26 RX ORDER — METOPROLOL SUCCINATE 25 MG/1
25 TABLET, EXTENDED RELEASE ORAL DAILY
Qty: 90 TABLET | Refills: 3 | Status: SHIPPED | OUTPATIENT
Start: 2021-02-26 | End: 2022-06-03

## 2021-03-01 RX ORDER — METOPROLOL SUCCINATE 25 MG/1
25 TABLET, EXTENDED RELEASE ORAL DAILY
Qty: 90 TABLET | Refills: 3 | Status: SHIPPED | OUTPATIENT
Start: 2021-03-01 | End: 2021-07-01

## 2021-03-17 RX ORDER — ASPIRIN 81 MG
TABLET,CHEWABLE ORAL
Qty: 90 TABLET | Refills: 2 | Status: SHIPPED | OUTPATIENT
Start: 2021-03-17 | End: 2021-12-14

## 2021-04-06 ENCOUNTER — PROCEDURE VISIT (OUTPATIENT)
Dept: AUDIOLOGY | Age: 41
End: 2021-04-06
Payer: COMMERCIAL

## 2021-04-06 DIAGNOSIS — H93.13 TINNITUS, BILATERAL: ICD-10-CM

## 2021-04-06 DIAGNOSIS — H90.3 SENSORINEURAL HEARING LOSS, BILATERAL: Primary | ICD-10-CM

## 2021-04-06 PROCEDURE — 92593 PR HEARING AID CHECK, BOTH EARS: CPT | Performed by: AUDIOLOGIST

## 2021-05-07 RX ORDER — CLOPIDOGREL BISULFATE 75 MG/1
TABLET ORAL
Qty: 90 TABLET | Refills: 2 | Status: SHIPPED | OUTPATIENT
Start: 2021-05-07 | End: 2021-07-01 | Stop reason: ALTCHOICE

## 2021-06-24 ENCOUNTER — HOSPITAL ENCOUNTER (OUTPATIENT)
Age: 41
Discharge: HOME | End: 2021-06-24
Payer: COMMERCIAL

## 2021-06-24 DIAGNOSIS — I25.5 ISCHEMIC CARDIOMYOPATHY: ICD-10-CM

## 2021-06-24 DIAGNOSIS — I25.42 CORONARY ARTERY DISSECTION: ICD-10-CM

## 2021-06-24 DIAGNOSIS — E55.9 VITAMIN D DEFICIENCY: ICD-10-CM

## 2021-06-24 DIAGNOSIS — R07.89 OTHER CHEST PAIN: ICD-10-CM

## 2021-06-24 LAB
A/G RATIO: 1.3 (ref 1.1–2.2)
ALBUMIN SERPL-MCNC: 4.3 G/DL (ref 3.4–5)
ALP BLD-CCNC: 85 U/L (ref 40–129)
ALT SERPL-CCNC: 20 U/L (ref 10–40)
ANION GAP SERPL CALCULATED.3IONS-SCNC: 14 MMOL/L (ref 3–16)
AST SERPL-CCNC: 18 U/L (ref 15–37)
BASOPHILS ABSOLUTE: 0.1 K/UL (ref 0–0.2)
BASOPHILS RELATIVE PERCENT: 1.1 %
BILIRUB SERPL-MCNC: 0.4 MG/DL (ref 0–1)
BUN BLDV-MCNC: 11 MG/DL (ref 7–20)
C-REACTIVE PROTEIN: <3 MG/L (ref 0–5.1)
CALCIUM SERPL-MCNC: 8.9 MG/DL (ref 8.3–10.6)
CHLORIDE BLD-SCNC: 101 MMOL/L (ref 99–110)
CHOLESTEROL, FASTING: 106 MG/DL (ref 0–199)
CO2: 24 MMOL/L (ref 21–32)
CREAT SERPL-MCNC: 0.8 MG/DL (ref 0.6–1.1)
EOSINOPHILS ABSOLUTE: 0.1 K/UL (ref 0–0.6)
EOSINOPHILS RELATIVE PERCENT: 1.6 %
GFR AFRICAN AMERICAN: >60
GFR NON-AFRICAN AMERICAN: >60
GLOBULIN: 3.2 G/DL
GLUCOSE BLD-MCNC: 87 MG/DL (ref 70–99)
HCT VFR BLD CALC: 43.9 % (ref 36–48)
HDLC SERPL-MCNC: 47 MG/DL (ref 40–60)
HEMOGLOBIN: 14.9 G/DL (ref 12–16)
LDL CHOLESTEROL CALCULATED: 50 MG/DL
LYMPHOCYTES ABSOLUTE: 1.7 K/UL (ref 1–5.1)
LYMPHOCYTES RELATIVE PERCENT: 30.4 %
MCH RBC QN AUTO: 31.5 PG (ref 26–34)
MCHC RBC AUTO-ENTMCNC: 34 G/DL (ref 31–36)
MCV RBC AUTO: 92.7 FL (ref 80–100)
MONOCYTES ABSOLUTE: 0.4 K/UL (ref 0–1.3)
MONOCYTES RELATIVE PERCENT: 6.9 %
NEUTROPHILS ABSOLUTE: 3.3 K/UL (ref 1.7–7.7)
NEUTROPHILS RELATIVE PERCENT: 60 %
PDW BLD-RTO: 13 % (ref 12.4–15.4)
PLATELET # BLD: 243 K/UL (ref 135–450)
PMV BLD AUTO: 8.7 FL (ref 5–10.5)
POTASSIUM SERPL-SCNC: 4.6 MMOL/L (ref 3.5–5.1)
RBC # BLD: 4.74 M/UL (ref 4–5.2)
SEDIMENTATION RATE, ERYTHROCYTE: 9 MM/HR (ref 0–20)
SODIUM BLD-SCNC: 139 MMOL/L (ref 136–145)
TOTAL PROTEIN: 7.5 G/DL (ref 6.4–8.2)
TRIGLYCERIDE, FASTING: 45 MG/DL (ref 0–150)
VITAMIN D 25-HYDROXY: 20.2 NG/ML
VLDLC SERPL CALC-MCNC: 9 MG/DL
WBC # BLD: 5.5 K/UL (ref 4–11)

## 2021-06-24 PROCEDURE — 80053 COMPREHEN METABOLIC PANEL: CPT

## 2021-06-24 PROCEDURE — 82306 VITAMIN D 25 HYDROXY: CPT

## 2021-06-24 PROCEDURE — 85025 COMPLETE CBC W/AUTO DIFF WBC: CPT

## 2021-06-24 PROCEDURE — 36415 COLL VENOUS BLD VENIPUNCTURE: CPT

## 2021-06-24 PROCEDURE — 86140 C-REACTIVE PROTEIN: CPT

## 2021-06-24 PROCEDURE — 80061 LIPID PANEL: CPT

## 2021-06-24 PROCEDURE — 85652 RBC SED RATE AUTOMATED: CPT

## 2021-06-30 ENCOUNTER — TELEPHONE (OUTPATIENT)
Dept: CARDIOLOGY CLINIC | Age: 41
End: 2021-06-30

## 2021-06-30 RX ORDER — ERGOCALCIFEROL 1.25 MG/1
50000 CAPSULE ORAL WEEKLY
Qty: 12 CAPSULE | Refills: 0 | Status: SHIPPED | OUTPATIENT
Start: 2021-06-30 | End: 2021-09-15

## 2021-06-30 NOTE — PROGRESS NOTES
[address] [facility]   Cardiovascular Evaluation    PATIENT: Sonja Shrestha  DATE: 2021  MRN: [id number]  CSN: [id number]  : 1980      Primary Care Doctor: Abdifatah Hansen DO  Reason for evaluation:   6 Month Follow-Up      Subjective:   History of present illness on initial date of evaluation:   Sojna Shrestha is a 35 y.o. patient who initially presented for hospital follow up. She presented to the hospital on 19 with complaints of chest pain. [facility] performed. Acute Anterior STEMI with Spontaneous Coronary Artery Dissection. OV 20 she presented with her  to discuss her testing and medications. She reported feeling ok. She stated she does feel a fluttering in her chest.  She stated more so when she comes home from work. She denied chest pain, sob, dizziness or syncope. Today she reports being chronically fatigued. She did not start L-arginine. She does note BLE edema that is not painful. She denies shortness of breath. Lasix is not helping with edema. She denies smoking. She has never had children. She denies history of DVT. She denies chest pain, shortness of breath, or dizziness. Patient Active Problem List   Diagnosis    Acute ST elevation myocardial infarction (STEMI) ([facility]    Coronary artery dissection    Hypomagnesemia    Bilateral hearing loss    Ischemic cardiomyopathy    NSVT (nonsustained ventricular tachycardia) (HCC)    Sensorineural hearing loss, bilateral    Palpitations    Other chest pain    Edema of both legs         Past Medical History:   has a past medical history of Allergic rhinitis, GERD (gastroesophageal reflux disease), Headache, Hearing disorder, Ischemic cardiomyopathy, STEMI (ST elevation myocardial infarction) ([facility] and Tinnitus. Surgical History:   has a past surgical history that includes knee surgery and Cardiac catheterization (2019).      Social History:   reports that she has never smoked. She has never used smokeless tobacco. She reports current alcohol use. She reports that she does not use drugs. Family History:  No evidence for sudden cardiac death or premature CAD    Home Medications:  Reviewed and are listed in nursing record. and/or listed below  Current Outpatient Medications   Medication Sig Dispense Refill    ASPIRIN LOW DOSE 81 MG chewable tablet TAKE 1 TABLET BY MOUTH EVERY DAY 90 tablet 2    metoprolol succinate (TOPROL XL) 25 MG extended release tablet Take 1 tablet by mouth daily 90 tablet 3    lisinopril (PRINIVIL;ZESTRIL) 2.5 MG tablet TAKE 1 TABLET BY MOUTH EVERY DAY 90 tablet 3    atorvastatin (LIPITOR) 40 MG tablet TAKE 1 TABLET BY MOUTH EVERY DAY AT NIGHT 90 tablet 3    NORETHINDRONE PO Take by mouth      Coenzyme Q10 (COQ10 PO) Take by mouth      vitamin D (ERGOCALCIFEROL) 1.25 MG (36103 UT) CAPS capsule Take 1 capsule by mouth once a week 1 capsule by mouth once a week Monday (Patient not taking: Reported on 7/1/2021) 12 capsule 0     No current facility-administered medications for this visit. Allergies:  Codeine     Review of Systems:   A 14 point review of symptoms completed. Pertinent positives identified in the HPI, all other review of symptoms negative as below.     Objective:   PHYSICAL EXAM:    Vitals:    07/01/21 1350   BP: 120/72   Pulse: 75   SpO2: 100%    Weight: 190 lb (86.2 kg)     Wt Readings from Last 3 Encounters:   07/01/21 190 lb (86.2 kg)   01/14/21 187 lb (84.8 kg)   11/24/20 186 lb 6.4 oz (84.6 kg)         General Appearance:  Alert, cooperative, no distress, appears stated age   Head:  Normocephalic, atraumatic   Eyes:  PERRL, conjunctiva/corneas clear   Nose: Nares normal, no drainage or sinus tenderness   Throat: Lips, mucosa, and tongue normal   Neck: Supple, symmetrical, trachea midline, NL thyroid no carotid bruit or JVD   Lungs:   CTAB, respirations unlabored   Chest Wall:  No tenderness or deformity   Heart:  Regular rhythm and normal rate; S1, S2 are normal;   no murmur noted; no rub or gallop   Abdomen:   Soft, non-tender, +BS x 4, no masses, no organomegaly   Extremities: Extremities normal, atraumatic, no cyanosis plus doughy nonpitting edema bilateral lower extremities   Pulses: 2+ and symmetric   Skin: Skin color, texture, turgor normal, no rashes or lesions   Pysch: Normal mood and affect   Neurologic: Normal gross motor and sensory exam.         LABS   CBC:      Lab Results   Component Value Date    WBC 5.5 06/24/2021    RBC 4.74 06/24/2021    HGB 14.9 06/24/2021    HCT 43.9 06/24/2021    MCV 92.7 06/24/2021    RDW 13.0 06/24/2021     06/24/2021     CMP:  Lab Results   Component Value Date     06/24/2021    K 4.6 06/24/2021    K 3.9 11/27/2019     06/24/2021    CO2 24 06/24/2021    BUN 11 06/24/2021    CREATININE 0.8 06/24/2021    GFRAA >60 06/24/2021    GFRAA >60 05/25/2011    AGRATIO 1.3 06/24/2021    LABGLOM >60 06/24/2021    GLUCOSE 87 06/24/2021    PROT 7.5 06/24/2021    PROT 6.7 05/25/2011    CALCIUM 8.9 06/24/2021    BILITOT 0.4 06/24/2021    ALKPHOS 85 06/24/2021    AST 18 06/24/2021    ALT 20 06/24/2021     PT/INR:   No results found for: PTINR  Liver:  No components found for: CHLPL  Lab Results   Component Value Date    ALT 20 06/24/2021    AST 18 06/24/2021    ALKPHOS 85 06/24/2021    BILITOT 0.4 06/24/2021     No results found for: LABA1C  Lipids:         Lab Results   Component Value Date    TRIG 67 02/25/2020    TRIG 76 11/27/2019            Lab Results   Component Value Date    HDL 47 06/24/2021    HDL 39 (L) 02/25/2020    HDL 50 11/27/2019            Lab Results   Component Value Date    LDLCALC 50 06/24/2021    LDLCALC 62 02/25/2020    LDLCALC 94 11/27/2019            Lab Results   Component Value Date    LABVLDL 9 06/24/2021    LABVLDL 13 02/25/2020    LABVLDL 15 11/27/2019         CARDIAC DATA   EKG:    ECHO 7/24/20  Summary   Left ventricular systolic function is low normal with a visually estimated   ejection fraction of 50-55%. EF calculated by Greenberg's method at 54%. The left ventricle is normal in size with normal wall thickness. Apical   hypokinesis. Normal left ventricular diastolic function. No significant valvular disease. [address] [place] [address]: 11/26/19  Summary  The left ventricular systolic function is moderately reduced with an  ejection fraction of 35 -40 %. There is severe hypokinesis of the anterior and apical walls consistent with  infarction within the territory of the left anterior descending artery. Grade I diastolic dysfunction with normal filling pressure. Mild mitral regurgitation. STRESS TEST: 1/26/21   Summary  Abnormal moderate risk myocardial perfusion study. There is a moderate sized mainly fixed defect within the distal apical  septum and apex consistent with prior infarction. Normal left ventricular size, wall motion, and function. The estimated left ventricular function is 58%. CARDIAC CATH:    2 WEEK CARDIAC MONITOR 1/29/20-2/7/20  Rare PVC/PAC    VASCULAR/OTHER IMAGING:      Assessment and Plan   Ebenezer Hoffman is a 35 y.o. female who presents today for the following problems:        1. Hx of STEMI: trop peaked at 5.1  2. Spontanous coronary dissection:              - conservative management    - Evaluated by rheumatology without conclusive diagnosis/vasculitis   - Genetic test showing VUS (variant unknown significance) only, no obvious dx  3. Ischemic cardiomyopathy: Improved    LVEF 35-40%-->50-55%   4. Hearing loss: present since birth per pt   -Patient now wearing hearing aids and doing very well  5. NSVT: improved on toprol    no more complaints  6. BLE edema: new? CVI    MD PLAN  1. Patient is doing well from a cardiac standpoint with resolution of chest pain. She still fatigued but has been this way for years prior to MI  2. We will DC L-arginine, Plavix and Lasix at this time is no longer needed.   3.  She has significant lower extremity edema by and suspicious more for chronic venous insufficiency due to venous congestion seen on exam.   -We will get venous Dopplers to evaluate    -Did discuss ambulation, elevation, compression   -Discussed vascular referral if desired for treatment based on above  4. Cont coreg and lisinopril              - educated on need for Birth control on lisinopril       Patient Active Problem List   Diagnosis    Acute ST elevation myocardial infarction (STEMI) ([facility]    Coronary artery dissection    Hypomagnesemia    Bilateral hearing loss    Ischemic cardiomyopathy    [facility]onsust[facility] ventricular tachycardia) ([facility])    Sensorineural hearing loss, bilateral    Palpitations    Other chest pain    Edema of both legs       Patient Plan:  1. Stop Plavix and Lasix  2. Continue baby aspirin daily  3. Wear knee hi compression stockings during day (30mm/mercury)  4. Call [phone] to schedule ultrasound of legs  5. Follow up with in a year    This note was scribed in the presence of Teetee Gamino MD by Lia Hoang RN. It is a pleasure to assist in the care of Ayla Velásquez. Please call with any questions. Mirta Johnson MD, personally performed the services described in this documentation as scribed by the above signed scribe in my presence, and it is both accurate and complete to the best of our ability and knowledge. I agree with the details independently gathered by my clinical support staff, while the remaining scribed note accurately describes my personal service to the patient. The above RN is working as a scribe for and in the presence of myself . Working as a scribe, the RN may have prepopulated components of this note with my historical intellectual property under my direct supervision. Any additions to this intellectual property were performed at my direction. Furthermore, the content and accuracy of this note have been reviewed by me to the best of my ability.          Carlton Diaz Latrice Kwan MD, [address] [facility] Cardiologist  [facility]  [phone] [address] [facility]  [phone] [address] [place]

## 2021-06-30 NOTE — TELEPHONE ENCOUNTER
----- Message from Larey Dandy, MD sent at 6/29/2021  4:05 PM EDT -----  Please let patient know that her vitamin D did come back fairly low. Start 50,000 units p.o. q. weekly on a Monday. We will do for 3 months and recheck. Give quantity sufficient for 3 months only. The rest of her labs are looking pretty good there is no further inflammation, lipids continue to improve and are much better over the last year.   No other changes follow-up in office as usual

## 2021-07-01 ENCOUNTER — OFFICE VISIT (OUTPATIENT)
Dept: CARDIOLOGY CLINIC | Age: 41
End: 2021-07-01
Payer: COMMERCIAL

## 2021-07-01 VITALS
SYSTOLIC BLOOD PRESSURE: 120 MMHG | DIASTOLIC BLOOD PRESSURE: 72 MMHG | WEIGHT: 190 LBS | HEIGHT: 67 IN | OXYGEN SATURATION: 100 % | BODY MASS INDEX: 29.82 KG/M2 | HEART RATE: 75 BPM

## 2021-07-01 DIAGNOSIS — I25.5 ISCHEMIC CARDIOMYOPATHY: ICD-10-CM

## 2021-07-01 DIAGNOSIS — I25.42 CORONARY ARTERY DISSECTION: Primary | ICD-10-CM

## 2021-07-01 DIAGNOSIS — R60.0 EDEMA OF BOTH LEGS: ICD-10-CM

## 2021-07-01 DIAGNOSIS — I87.2 VENOUS REFLUX: ICD-10-CM

## 2021-07-01 DIAGNOSIS — I47.29 NSVT (NONSUSTAINED VENTRICULAR TACHYCARDIA): ICD-10-CM

## 2021-07-01 PROCEDURE — G8419 CALC BMI OUT NRM PARAM NOF/U: HCPCS | Performed by: INTERNAL MEDICINE

## 2021-07-01 PROCEDURE — 1036F TOBACCO NON-USER: CPT | Performed by: INTERNAL MEDICINE

## 2021-07-01 PROCEDURE — 99214 OFFICE O/P EST MOD 30 MIN: CPT | Performed by: INTERNAL MEDICINE

## 2021-07-01 PROCEDURE — G8427 DOCREV CUR MEDS BY ELIG CLIN: HCPCS | Performed by: INTERNAL MEDICINE

## 2021-07-01 NOTE — PATIENT INSTRUCTIONS
Patient Plan:  1. Stop Plavix and Lasix  2. Continue baby aspirin daily  3. Wear knee hi compression stockings during day (30mm/mercury)  4. Call [phone] to schedule ultrasound of legs  5.  Follow up with in a year

## 2021-09-15 RX ORDER — ERGOCALCIFEROL 1.25 MG/1
CAPSULE ORAL
Qty: 12 CAPSULE | Refills: 3 | Status: SHIPPED | OUTPATIENT
Start: 2021-09-15 | End: 2022-08-22

## 2021-10-01 NOTE — PROGRESS NOTES
Rere Dsouza  [birth year]77 y.o. female   [id number]    HE[facility]ING [address] [facility] Rafael Sheppard was seen today, 10/7/2021, for a hearing aid check appointment    PROCEDURES:     Otoscopy revealed: Clear ear canals bilaterally    Patient noted overall benefit with devices. Hearing aids were cleaned and checked. A listening check revealed good function of the devices. Microphone and  ports were suctioned, domes and wax traps were changed, and devices completed a desiccant cycle through [facility]. Post-cleaning listening check revealed good function of the devices. Connected devices to fitting software. Datalogging revealed 11 hours of use per day. Gave patient additional small power domes for left and medium power domes with slit for right. PATIENT EDUCATION:     - Verbally and visually reviewed importance of consistent use and care and maintenance of devices. Information was verbally shared with patient during appointment. RECOMMENDATIONS:     - Continue consistent hearing aid use  - Return for hearing aid checks as needed  - Retest hearing as medically indicated and/or sooner if a change in hearing is noted. - Contact audiologist with questions/concerns as needed      **Patient paid $50.00 hearing aid check fee for today's appointment.     Lamar Jacinto  Audiologist

## 2021-10-07 ENCOUNTER — PROCEDURE VISIT (OUTPATIENT)
Dept: AUDIOLOGY | Age: 41
End: 2021-10-07

## 2021-10-07 DIAGNOSIS — H93.13 TINNITUS, BILATERAL: ICD-10-CM

## 2021-10-07 DIAGNOSIS — H90.3 SENSORINEURAL HEARING LOSS, BILATERAL: Primary | ICD-10-CM

## 2021-10-07 PROCEDURE — 92593 PR HEARING AID CHECK, BOTH EARS: CPT | Performed by: AUDIOLOGIST

## 2021-12-06 ENCOUNTER — TELEPHONE (OUTPATIENT)
Dept: CARDIOLOGY CLINIC | Age: 41
End: 2021-12-06

## 2021-12-07 NOTE — TELEPHONE ENCOUNTER
Per HIPAA spoke with patients  Emeli Cross to clarify why patient needed an exemption. States that due to patients extensive cardiac history she would like to be exempt from taking it.

## 2021-12-09 NOTE — TELEPHONE ENCOUNTER
Per HIPAA spoke with patients  Francisco Benavides, relayed message from Dr. Dev Black. Explained that Dr. Dev Black is a colleague to Dr. Raymon Spencer and that Dr. Raymon Spencer is an advocate for the vaccine. Francisco Benavides insists on having Dr. Raymon Spencer review the message.

## 2021-12-09 NOTE — TELEPHONE ENCOUNTER
Per HIPAA spoke with patients  Vargas Gutierrez, relayed message from Dr. Imtiaz Plummer. Verbalized understanding.

## 2021-12-14 DIAGNOSIS — I21.3 ACUTE ST ELEVATION MYOCARDIAL INFARCTION (STEMI), UNSPECIFIED ARTERY (HCC): Primary | ICD-10-CM

## 2021-12-14 RX ORDER — ASPIRIN 81 MG
TABLET,CHEWABLE ORAL
Qty: 90 TABLET | Refills: 3 | Status: SHIPPED | OUTPATIENT
Start: 2021-12-14

## 2022-02-11 DIAGNOSIS — I50.22 SYSTOLIC CHF, CHRONIC (HCC): ICD-10-CM

## 2022-02-11 RX ORDER — LISINOPRIL 2.5 MG/1
TABLET ORAL
Qty: 90 TABLET | Refills: 3 | Status: SHIPPED | OUTPATIENT
Start: 2022-02-11

## 2022-02-12 DIAGNOSIS — E78.5 HYPERLIPIDEMIA, UNSPECIFIED HYPERLIPIDEMIA TYPE: Primary | ICD-10-CM

## 2022-02-12 DIAGNOSIS — I50.22 SYSTOLIC CHF, CHRONIC (HCC): ICD-10-CM

## 2022-02-14 RX ORDER — ATORVASTATIN CALCIUM 40 MG/1
TABLET, FILM COATED ORAL
Qty: 90 TABLET | Refills: 3 | Status: SHIPPED | OUTPATIENT
Start: 2022-02-14

## 2022-04-05 NOTE — PROGRESS NOTES
Jilleliazar Posadas  [birth year]42 y.o. female   [id number]    [facility]ING [address] [facility] Zohaib Sultana was seen today, 4/7/2022, for a hearing aid check appointment. PROCEDURES:     Otoscopy revealed: Clear ear canals bilaterally    Patient noted overall benefit and consistent use with devices. Hearing aids were cleaned and checked. A listening check revealed good function of the devices. Microphone and  ports were suctioned, domes and wax traps were changed, and devices completed a desiccant cycle through [employer]. Post-cleaning listening check revealed good function of the devices. PATIENT EDUCATION:     - Verbally and visually reviewed importance of consistent use and care and maintenance of devices. Information was verbally shared with patient during appointment. RECOMMENDATIONS:     - Continue consistent hearing aid use  - Return for hearing aid checks as needed  - Retest hearing as medically indicated and/or sooner if a change in hearing is noted. - Contact audiologist with questions/concerns as needed    **Patient paid $50.00 for hearing aid check fee for today's appointment.     Lamar Cuello  Audiologist

## 2022-04-07 ENCOUNTER — PROCEDURE VISIT (OUTPATIENT)
Dept: AUDIOLOGY | Age: 42
End: 2022-04-07

## 2022-04-07 DIAGNOSIS — H90.3 SENSORINEURAL HEARING LOSS, BILATERAL: Primary | ICD-10-CM

## 2022-04-07 DIAGNOSIS — H93.13 TINNITUS, BILATERAL: ICD-10-CM

## 2022-04-07 PROCEDURE — 92593 PR HEARING AID CHECK, BOTH EARS: CPT | Performed by: AUDIOLOGIST

## 2022-06-03 DIAGNOSIS — I50.22 SYSTOLIC CHF, CHRONIC (HCC): ICD-10-CM

## 2022-06-03 RX ORDER — METOPROLOL SUCCINATE 25 MG/1
TABLET, EXTENDED RELEASE ORAL
Qty: 90 TABLET | Refills: 0 | Status: SHIPPED | OUTPATIENT
Start: 2022-06-03 | End: 2022-08-31

## 2022-07-28 ENCOUNTER — TELEPHONE (OUTPATIENT)
Dept: CARDIOLOGY CLINIC | Age: 42
End: 2022-07-28

## 2022-07-28 NOTE — TELEPHONE ENCOUNTER
Pts  called [facility] and stated pt has covid and they spoke w/ pts pharmacy ([facility] [address] [address]   Phone:  [phone]  Fax:  [phone]) and they stated to ask pts cardiologist to prescribe pt a antiviral prescription, please advise.

## 2022-07-29 NOTE — TELEPHONE ENCOUNTER
This needs to be deferred to her PCP and of note Dr. Kelvin Hawthorne is not her PCP. She does not have a PCP she needs to talk to the department of health. Cardiology does not not deal with COVID antivirals.

## 2022-08-22 ENCOUNTER — TELEPHONE (OUTPATIENT)
Dept: CARDIOLOGY CLINIC | Age: 42
End: 2022-08-22

## 2022-08-22 RX ORDER — ERGOCALCIFEROL 1.25 MG/1
CAPSULE ORAL
Qty: 12 CAPSULE | Refills: 0 | Status: SHIPPED | OUTPATIENT
Start: 2022-08-22

## 2022-08-22 NOTE — TELEPHONE ENCOUNTER
08/22/2022 called pt @ [phone] and pts  said he would have pt call us back at later time to schedule due to pts working 3rd shift and pt was asleep.

## 2022-08-26 NOTE — TELEPHONE ENCOUNTER
08/26-Called ([phone]) unable to make contact, LM. No other numbers listed for contact. A letter has been mailed to listed address on file. Just wanted to make the MA & RN staff aware that we have made several attempts to contact.

## 2022-08-29 DIAGNOSIS — I50.22 SYSTOLIC CHF, CHRONIC (HCC): ICD-10-CM

## 2022-08-31 RX ORDER — METOPROLOL SUCCINATE 25 MG/1
TABLET, EXTENDED RELEASE ORAL
Qty: 30 TABLET | Refills: 3 | Status: SHIPPED | OUTPATIENT
Start: 2022-08-31 | End: 2022-11-18 | Stop reason: SDUPTHER

## 2022-11-15 RX ORDER — ERGOCALCIFEROL 1.25 MG/1
CAPSULE ORAL
Qty: 12 CAPSULE | Refills: 1 | OUTPATIENT
Start: 2022-11-15

## 2022-11-15 NOTE — PROGRESS NOTES
[address] [facility]   Cardiovascular Evaluation    PATIENT: Jamila Leonard  DATE: 2022  MRN: [id number]  CSN: [id number]  : 1980      Primary Care Doctor: Kareen Hansen DO  Reason for evaluation:   1 Year Follow Up, Cardiomyopathy, and Chest Pain (One episode a month ago )      Subjective:   History of present illness on initial date of evaluation:   Jamila Leonard is a 38 y.o. patient who presents for follow up. She has a history of STEMI, NSVT, ischemic cardiomyopathy. Initially presented for hospital follow up. She presented to the hospital on 19 with complaints of chest pain. [facility] performed. Acute Anterior STEMI with Spontaneous Coronary Artery Dissection. OV 20 she presented with her  to discuss her testing and medications. She reported feeling ok. She stated she does feel a fluttering in her chest.  She stated more so when she comes home from work. OV 2021,  she reports being chronically fatigued. She did not start L-arginine. She does note BLE edema that is not painful. She denies shortness of breath. Lasix is not helping with edema. She denies smoking. She has never had children. Today, 2022, she says about a month ago she had an episode of jaw pain and numbness in her arm. She denies chest pain. She says this occurred while sitting at home. The jaw pain was similar symptom to when she had a cath previously. This has not occurred since. She is able to do activities without limitations. She has some swelling in her legs. Patient is taking all cardiac medications as prescribed and tolerates them well. Patient denies current  chest pain, sob, palpitations, or syncope.       Patient Active Problem List   Diagnosis    Acute ST elevation myocardial infarction (STEMI) [facility])    Coronary artery dissection    Hypomagnesemia    Bilateral hearing loss    Ischemic cardiomyopathy    NSVT (nonsustained ventricular tachycardia) Sensorineural hearing loss, bilateral    Palpitations    Other chest pain    Edema of both legs         Past Medical History:   has a past medical history of Allergic rhinitis, GERD (gastroesophageal reflux disease), Headache, Hearing disorder, Ischemic cardiomyopathy, STEMI (ST elevation myocardial infarction) ([facility] [address] and Tinnitus. Surgical History:   has a past surgical history that includes knee surgery and Cardiac catheterization (11/26/2019). Social History:   reports that she has never smoked. She has never used smokeless tobacco. She reports current alcohol use. She reports that she does not use drugs. Family History:  No evidence for sudden cardiac death or premature CAD    Home Medications:  Reviewed and are listed in nursing record. and/or listed below  Current Outpatient Medications   Medication Sig Dispense Refill    metoprolol succinate (TOPROL XL) 25 MG extended release tablet TAKE 1 TABLET BY MOUTH EVERY DAY 30 tablet 3    vitamin D (ERGOCALCIFEROL) 1.25 MG (00517 UT) CAPS capsule TAKE 1 CAPSULE BY MOUTH ONCE A WEEK ON MONDAY 12 capsule 0    atorvastatin (LIPITOR) 40 MG tablet TAKE 1 TABLET BY MOUTH EVERY DAY AT NIGHT 90 tablet 3    lisinopril (PRINIVIL;ZESTRIL) 2.5 MG tablet TAKE 1 TABLET BY MOUTH EVERY DAY 90 tablet 3    ASPIRIN LOW DOSE 81 MG chewable tablet TAKE 1 TABLET BY MOUTH EVERY DAY 90 tablet 3    NORETHINDRONE PO Take by mouth      Coenzyme Q10 (COQ10 PO) Take by mouth       No current facility-administered medications for this visit. Allergies:  Codeine     Review of Systems:   A 14 point review of symptoms completed. Pertinent positives identified in the HPI, all other review of symptoms negative as below.     Objective:   PHYSICAL EXAM:    Vitals:    11/18/22 0746   BP: 108/66   Pulse: 70   SpO2: 96%      Weight: 197 lb (89.4 kg)     Wt Readings from Last 3 Encounters:   11/18/22 197 lb (89.4 kg)   07/01/21 190 lb (86.2 kg)   01/14/21 187 lb (84.8 kg)         General Appearance:  Alert, cooperative, no distress, appears stated age   Head:  Normocephalic, atraumatic   Eyes:  PERRL, conjunctiva/corneas clear   Nose: Nares normal, no drainage or sinus tenderness   Throat: Lips, mucosa, and tongue normal   Neck: Supple, symmetrical, trachea midline, NL thyroid no carotid bruit or JVD   Lungs:   CTAB, respirations unlabored   Chest Wall:  No tenderness or deformity   Heart:  Regular rhythm and normal rate; S1, S2 are normal;   no murmur noted; no rub or gallop   Abdomen:   Soft, non-tender, +BS x 4, no masses, no organomegaly   Extremities: Extremities normal, atraumatic, no cyanosis plus doughy nonpitting edema bilateral lower extremities   Pulses: 2+ and symmetric   Skin: Skin color, texture, turgor normal, no rashes or lesions   Pysch: Normal mood and affect   Neurologic: Normal gross motor and sensory exam.         LABS   CBC:      Lab Results   Component Value Date/Time    WBC 5.5 06/24/2021 08:42 AM    RBC 4.74 06/24/2021 08:42 AM    HGB 14.9 06/24/2021 08:42 AM    HCT 43.9 06/24/2021 08:42 AM    MCV 92.7 06/24/2021 08:42 AM    RDW 13.0 06/24/2021 08:42 AM     06/24/2021 08:42 AM     CMP:  Lab Results   Component Value Date/Time     06/24/2021 08:42 AM    K 4.6 06/24/2021 08:42 AM    K 3.9 11/27/2019 07:10 AM     06/24/2021 08:42 AM    CO2 24 06/24/2021 08:42 AM    BUN 11 06/24/2021 08:42 AM    CREATININE 0.8 06/24/2021 08:42 AM    GFRAA >60 06/24/2021 08:42 AM    GFRAA >60 05/25/2011 02:44 PM    AGRATIO 1.3 06/24/2021 08:42 AM    LABGLOM >60 06/24/2021 08:42 AM    GLUCOSE 87 06/24/2021 08:42 AM    PROT 7.5 06/24/2021 08:42 AM    PROT 6.7 05/25/2011 02:44 PM    CALCIUM 8.9 06/24/2021 08:42 AM    BILITOT 0.4 06/24/2021 08:42 AM    ALKPHOS 85 06/24/2021 08:42 AM    AST 18 06/24/2021 08:42 AM    ALT 20 06/24/2021 08:42 AM     PT/INR:   No results found for: PTINR  Liver:  No components found for: CHLPL  Lab Results   Component Value Date    ALT 20 06/24/2021 AST 18 06/24/2021    ALKPHOS 85 06/24/2021    BILITOT 0.4 06/24/2021     No results found for: LABA1C  Lipids:         Lab Results   Component Value Date    TRIG 67 02/25/2020    TRIG 76 11/27/2019            Lab Results   Component Value Date    HDL 47 06/24/2021    HDL 39 (L) 02/25/2020    HDL 50 11/27/2019            Lab Results   Component Value Date    LDLCALC 50 06/24/2021    LDLCALC 62 02/25/2020    LDLCALC 94 11/27/2019            Lab Results   Component Value Date    LABVLDL 9 06/24/2021    LABVLDL 13 02/25/2020    LABVLDL 15 11/27/2019         CARDIAC DATA   EKG:    ECHO 7/24/20  Summary   Left ventricular systolic function is low normal with a visually estimated   ejection fraction of 50-55%. EF calculated by Greenberg's method at 54%. The left ventricle is normal in size with normal wall thickness. Apical   hypokinesis. Normal left ventricular diastolic function. No significant valvular disease. [address] [place] [address] Street: 11/26/19  Summary  The left ventricular systolic function is moderately reduced with an  ejection fraction of 35 -40 %. There is severe hypokinesis of the anterior and apical walls consistent with  infarction within the territory of the left anterior descending artery. Grade I diastolic dysfunction with normal filling pressure. Mild mitral regurgitation. STRESS TEST: 1/26/21   Summary  Abnormal moderate risk myocardial perfusion study. There is a moderate sized mainly fixed defect within the distal apical  septum and apex consistent with prior infarction. Normal left ventricular size, wall motion, and function. The estimated left ventricular function is 58%. CARDIAC CATH:    2 WEEK CARDIAC MONITOR 1/29/20-2/7/20  Rare PVC/PAC    VASCULAR/OTHER IMAGING:      Assessment and Plan   Cassi Ashley is a 38 y.o. female who presents today for the following problems:        1. Hx of STEMI: trop peaked at 5.1 due to #2  2.  Spontanous coronary dissection:              - conservative management, no PCI   - Evaluated by rheumatology without conclusive diagnosis/vasculitis   - Genetic test showing VUS (variant unknown significance) only, no obvious dx  3. Ischemic cardiomyopathy: Improved    LVEF 35-40%-->50-55%   4. Hearing loss: present since birth per pt   -Patient now wearing hearing aids and doing very well  5. NSVT: improved on toprol   No complaints  6. BLE edema: new CVI    MD PLAN  1. Continues to do well with no chest pain or angina. 2.  She asked about being active and I did tell her that she had a spontaneous coronary dissection that was idiopathic without obvious etiology. She is therefore at slightly increased risk compared to others in the population but her blood pressure and heart rate is well controlled, EF has fully recovered and from my standpoint risk versus benefit I would say I would not put any restrictions on her at this time but cannot guarantee future safety  3. She had 1 episode a month back of chest pain that was slightly atypical but is greatly concerned her    -We will get GXT stress echo to evaluate that episode as well as future exercise tolerance safety  4. She has significant lower extremity edema by and suspicious more for chronic venous insufficiency due to venous congestion seen on exam.   -I did offer venous Doppler to evaluate and she declined at this time  5. Cont coreg and lisinopril              - educated on need for Birth control on lisinopril       Patient Active Problem List   Diagnosis    Acute ST elevation myocardial infarction (STEMI) ([facility] [address]    Coronary artery dissection    Hypomagnesemia    Bilateral hearing loss    Ischemic cardiomyopathy    NSVT (nonsustained ventricular tachycardia)    Sensorineural hearing loss, bilateral    Palpitations    Other chest pain    Edema of both legs       Patient Plan:  1. Echo exercise stress test    Call [phone] to schedule   2. Continue current medications  3.  Follow up in 1 year    This note was scribed in the presence of Diego Guillen MD by Dang Benavides RN.      Teofilo Echavarria MD, personally performed the services described in this documentation as scribed by the above signed scribe in my presence, and it is both accurate and complete to the best of our ability and knowledge. I agree with the details independently gathered by my clinical support staff, while the remaining scribed note accurately describes my personal service to the patient. The above RN is working as a scribe for and in the presence of myself . Working as a scribe, the RN may have prepopulated components of this note with my historical intellectual property under my direct supervision. Any additions to this intellectual property were performed at my direction. Furthermore, the content and accuracy of this note have been reviewed by me to the best of my ability. This chart may be generated in part by using Dragon Dictation system and may contain errors related to that system including errors in grammar, punctuation, and spelling, as well as words and phrases that may be inappropriate. If there are any questions or concerns please feel free to contact the dictating provider for clarification.

## 2022-11-16 NOTE — TELEPHONE ENCOUNTER
Spoke with Shoshana Deluna, who is on HIPAA form. He said she has 11/18 JJP appt and he will have her discuss it with him then.

## 2022-11-18 ENCOUNTER — OFFICE VISIT (OUTPATIENT)
Dept: CARDIOLOGY CLINIC | Age: 42
End: 2022-11-18
Payer: COMMERCIAL

## 2022-11-18 VITALS
OXYGEN SATURATION: 96 % | WEIGHT: 197 LBS | SYSTOLIC BLOOD PRESSURE: 108 MMHG | HEIGHT: 67 IN | DIASTOLIC BLOOD PRESSURE: 66 MMHG | BODY MASS INDEX: 30.92 KG/M2 | HEART RATE: 70 BPM

## 2022-11-18 DIAGNOSIS — I25.42 CORONARY ARTERY DISSECTION: Primary | ICD-10-CM

## 2022-11-18 DIAGNOSIS — R00.2 PALPITATIONS: ICD-10-CM

## 2022-11-18 DIAGNOSIS — R07.89 OTHER CHEST PAIN: ICD-10-CM

## 2022-11-18 DIAGNOSIS — I25.5 ISCHEMIC CARDIOMYOPATHY: ICD-10-CM

## 2022-11-18 DIAGNOSIS — I21.3 ACUTE ST ELEVATION MYOCARDIAL INFARCTION (STEMI), UNSPECIFIED ARTERY (HCC): ICD-10-CM

## 2022-11-18 DIAGNOSIS — I47.29 NSVT (NONSUSTAINED VENTRICULAR TACHYCARDIA): ICD-10-CM

## 2022-11-18 DIAGNOSIS — I50.22 SYSTOLIC CHF, CHRONIC (HCC): ICD-10-CM

## 2022-11-18 DIAGNOSIS — E78.5 HYPERLIPIDEMIA, UNSPECIFIED HYPERLIPIDEMIA TYPE: ICD-10-CM

## 2022-11-18 PROCEDURE — 99214 OFFICE O/P EST MOD 30 MIN: CPT | Performed by: INTERNAL MEDICINE

## 2022-11-18 PROCEDURE — G8484 FLU IMMUNIZE NO ADMIN: HCPCS | Performed by: INTERNAL MEDICINE

## 2022-11-18 PROCEDURE — 1036F TOBACCO NON-USER: CPT | Performed by: INTERNAL MEDICINE

## 2022-11-18 PROCEDURE — G8427 DOCREV CUR MEDS BY ELIG CLIN: HCPCS | Performed by: INTERNAL MEDICINE

## 2022-11-18 PROCEDURE — G8417 CALC BMI ABV UP PARAM F/U: HCPCS | Performed by: INTERNAL MEDICINE

## 2022-11-18 RX ORDER — METOPROLOL SUCCINATE 25 MG/1
TABLET, EXTENDED RELEASE ORAL
Qty: 90 TABLET | Refills: 3 | Status: SHIPPED | OUTPATIENT
Start: 2022-11-18

## 2022-11-18 RX ORDER — LISINOPRIL 2.5 MG/1
TABLET ORAL
Qty: 90 TABLET | Refills: 3 | Status: SHIPPED | OUTPATIENT
Start: 2022-11-18

## 2022-11-18 RX ORDER — ATORVASTATIN CALCIUM 40 MG/1
TABLET, FILM COATED ORAL
Qty: 90 TABLET | Refills: 3 | Status: SHIPPED | OUTPATIENT
Start: 2022-11-18

## 2022-11-18 RX ORDER — ASPIRIN 81 MG/1
TABLET, CHEWABLE ORAL
Qty: 90 TABLET | Refills: 3 | Status: SHIPPED | OUTPATIENT
Start: 2022-11-18

## 2022-11-18 NOTE — PATIENT INSTRUCTIONS
Patient Plan:  1. Echo exercise stress test    Call [phone] to schedule   2. Continue current medications  3.  Follow up in 1 year

## 2022-11-18 NOTE — LETTER
Lucy Hagen  1980      [address]   Cardiovascular Evaluation    PATIENT: Lucy Hagen  DATE: 2022  MRN: [id number]  CSN: [id number]  : 1980      Primary Care Doctor: Grace Hansen DO  Reason for evaluation:   1 Year Follow Up, Cardiomyopathy, and Chest Pain (One episode a month ago )      Subjective:   History of present illness on initial date of evaluation:   Lucy Hagen is a 38 y.o. patient who presents for follow up. She has a history of STEMI, NSVT, ischemic cardiomyopathy. Initially presented for hospital follow up. She presented to the hospital on 19 with complaints of chest pain. [facility] performed. Acute Anterior STEMI with Spontaneous Coronary Artery Dissection. OV 20 she presented with her  to discuss her testing and medications. She reported feeling ok. She stated she does feel a fluttering in her chest.  She stated more so when she comes home from work. OV 2021,  she reports being chronically fatigued. She did not start L-arginine. She does note BLE edema that is not painful. She denies shortness of breath. Lasix is not helping with edema. She denies smoking. She has never had children. Today, 2022, she says about a month ago she had an episode of jaw pain and numbness in her arm. She denies chest pain. She says this occurred while sitting at home. The jaw pain was similar symptom to when she had a cath previously. This has not occurred since. She is able to do activities without limitations. She has some swelling in her legs. Patient is taking all cardiac medications as prescribed and tolerates them well. Patient denies current  chest pain, sob, palpitations, or syncope.       Patient Active Problem List   Diagnosis    Acute ST [facility] ([facility]) ([facility] [address]    Coronary artery dissection    Hypomagnesemia    Bilateral hearing loss    Ischemic cardiomyopathy    NSVT (nonsustained ventricular tachycardia)    Sensorineural hearing loss, bilateral    Palpitations    Other chest pain    Edema of both legs         Past Medical History:   has a past medical history of Allergic rhinitis, GERD (gastroesophageal reflux disease), Headache, Hearing disorder, Ischemic cardiomyopathy, STEMI (ST elevation myocardial infarction) ([address] and Tinnitus. Surgical History:   has a past surgical history that includes knee surgery and Cardiac catheterization (11/26/2019). Social History:   reports that she has never smoked. She has never used smokeless tobacco. She reports current alcohol use. She reports that she does not use drugs. Family History:  No evidence for sudden cardiac death or premature CAD    Home Medications:  Reviewed and are listed in nursing record. and/or listed below  Current Outpatient Medications   Medication Sig Dispense Refill    metoprolol succinate (TOPROL XL) 25 MG extended release tablet TAKE 1 TABLET BY MOUTH EVERY DAY 30 tablet 3    vitamin D (ERGOCALCIFEROL) 1.25 MG (04761 UT) CAPS capsule TAKE 1 CAPSULE BY MOUTH ONCE A WEEK ON MONDAY 12 capsule 0    atorvastatin (LIPITOR) 40 MG tablet TAKE 1 TABLET BY MOUTH EVERY DAY AT NIGHT 90 tablet 3    lisinopril (PRINIVIL;ZESTRIL) 2.5 MG tablet TAKE 1 TABLET BY MOUTH EVERY DAY 90 tablet 3    ASPIRIN LOW DOSE 81 MG chewable tablet TAKE 1 TABLET BY MOUTH EVERY DAY 90 tablet 3    NORETHINDRONE PO Take by mouth      Coenzyme Q10 (COQ10 PO) Take by mouth       No current facility-administered medications for this visit. Allergies:  Codeine     Review of Systems:   A 14 point review of symptoms completed. Pertinent positives identified in the HPI, all other review of symptoms negative as below.     Objective:   PHYSICAL EXAM:    Vitals:    11/18/22 0746   BP: 108/66   Pulse: 70   SpO2: 96%      Weight: 197 lb (89.4 kg)     Wt Readings from Last 3 Encounters:   11/18/22 197 lb (89.4 kg)   07/01/21 190 lb (86.2 kg)   01/14/21 187 lb (84.8 kg)         General Appearance:  Alert, cooperative, no distress, appears stated age   Head:  Normocephalic, atraumatic   Eyes:  PERRL, conjunctiva/corneas clear   Nose: Nares normal, no drainage or sinus tenderness   Throat: Lips, mucosa, and tongue normal   Neck: Supple, symmetrical, trachea midline, NL thyroid no carotid bruit or JVD   Lungs:   CTAB, respirations unlabored   Chest Wall:  No tenderness or deformity   Heart:  Regular rhythm and normal rate; S1, S2 are normal;   no murmur noted; no rub or gallop   Abdomen:   Soft, non-tender, +BS x 4, no masses, no organomegaly   Extremities: Extremities normal, atraumatic, no cyanosis plus doughy nonpitting edema bilateral lower extremities   Pulses: 2+ and symmetric   Skin: Skin color, texture, turgor normal, no rashes or lesions   Pysch: Normal mood and affect   Neurologic: Normal gross motor and sensory exam.         LABS   CBC:      Lab Results   Component Value Date/Time    WBC 5.5 06/24/2021 08:42 AM    RBC 4.74 06/24/2021 08:42 AM    HGB 14.9 06/24/2021 08:42 AM    HCT 43.9 06/24/2021 08:42 AM    MCV 92.7 06/24/2021 08:42 AM    RDW 13.0 06/24/2021 08:42 AM     06/24/2021 08:42 AM     CMP:  Lab Results   Component Value Date/Time     06/24/2021 08:42 AM    K 4.6 06/24/2021 08:42 AM    K 3.9 11/27/2019 07:10 AM     06/24/2021 08:42 AM    CO2 24 06/24/2021 08:42 AM    BUN 11 06/24/2021 08:42 AM    CREATININE 0.8 06/24/2021 08:42 AM    GFRAA >60 06/24/2021 08:42 AM    GFRAA >60 05/25/2011 02:44 PM    AGRATIO 1.3 06/24/2021 08:42 AM    LABGLOM >60 06/24/2021 08:42 AM    GLUCOSE 87 06/24/2021 08:42 AM    PROT 7.5 06/24/2021 08:42 AM    PROT 6.7 05/25/2011 02:44 PM    CALCIUM 8.9 06/24/2021 08:42 AM    BILITOT 0.4 06/24/2021 08:42 AM    ALKPHOS 85 06/24/2021 08:42 AM    AST 18 06/24/2021 08:42 AM    ALT 20 06/24/2021 08:42 AM     PT/INR:   No results found for: PTINR  Liver:  No components found for: CHLPL  Lab Results   Component Value Date    ALT 20 06/24/2021    AST 18 06/24/2021    ALKPHOS 85 06/24/2021    BILITOT 0.4 06/24/2021     No results found for: LABA1C  Lipids:         Lab Results   Component Value Date    TRIG 67 02/25/2020    TRIG 76 11/27/2019            Lab Results   Component Value Date    HDL 47 06/24/2021    HDL 39 (L) 02/25/2020    HDL 50 11/27/2019            Lab Results   Component Value Date    LDLCALC 50 06/24/2021    LDLCALC 62 02/25/2020    LDLCALC 94 11/27/2019            Lab Results   Component Value Date    LABVLDL 9 06/24/2021    LABVLDL 13 02/25/2020    LABVLDL 15 11/27/2019         CARDIAC DATA   EKG:    ECHO 7/24/20  Summary   Left ventricular systolic function is low normal with a visually estimated   ejection fraction of 50-55%. EF calculated by Greenberg's method at 54%. The left ventricle is normal in size with normal wall thickness. Apical   hypokinesis. Normal left ventricular diastolic function. No significant valvular disease. [address] [place] [address]: 11/26/19  Summary  The left ventricular systolic function is moderately reduced with an  ejection fraction of 35 -40 %. There is severe hypokinesis of the anterior and apical walls consistent with  infarction within the territory of the left anterior descending artery. Grade I diastolic dysfunction with normal filling pressure. Mild mitral regurgitation. STRESS TEST: 1/26/21   Summary  Abnormal moderate risk myocardial perfusion study. There is a moderate sized mainly fixed defect within the distal apical  septum and apex consistent with prior infarction. Normal left ventricular size, wall motion, and function. The estimated left ventricular function is 58%. CARDIAC CATH:    2 WEEK CARDIAC MONITOR 1/29/20-2/7/20  Rare PVC/PAC    VASCULAR/OTHER IMAGING:      Assessment and Plan   Ida Cabot is a 38 y.o. female who presents today for the following problems:        1. Hx of STEMI: trop peaked at 5.1 due to #2  2.  Spontanous coronary dissection:              - conservative management, no PCI   - Evaluated by rheumatology without conclusive diagnosis/vasculitis   - Genetic test showing VUS (variant unknown significance) only, no obvious dx  3. Ischemic cardiomyopathy: Improved    LVEF 35-40%-->50-55%   4. Hearing loss: present since birth per pt   -Patient now wearing hearing aids and doing very well  5. NSVT: improved on toprol   No complaints  6. BLE edema: new CVI    MD PLAN  1. Continues to do well with no chest pain or angina. 2.  She asked about being active and I did tell her that she had a spontaneous coronary dissection that was idiopathic without obvious etiology. She is therefore at slightly increased risk compared to others in the population but her blood pressure and heart rate is well controlled, EF has fully recovered and from my standpoint risk versus benefit I would say I would not put any restrictions on her at this time but cannot guarantee future safety  3. She had 1 episode a month back of chest pain that was slightly atypical but is greatly concerned her    -We will get GXT stress echo to evaluate that episode as well as future exercise tolerance safety  4. She has significant lower extremity edema by and suspicious more for chronic venous insufficiency due to venous congestion seen on exam.   -I did offer venous Doppler to evaluate and she declined at this time  5. Cont coreg and lisinopril              - educated on need for Birth control on lisinopril       Patient Active Problem List   Diagnosis    Acute [facility] ([facility] ([facility] [address]    Coronary artery dissection    Hypomagnesemia    Bilateral hearing loss    Ischemic cardiomyopathy    NSVT (nonsustained ventricular tachycardia)    Sensorineural hearing loss, bilateral    Palpitations    Other chest pain    Edema of both legs       Patient Plan:  1. Echo exercise stress test    Call [phone] to schedule   2.  Continue current medications  3. Follow up in 1 year    This note was scribed in the presence of Patric Singh MD by Aylin Bird RN.      Brianna Jama MD, personally performed the services described in this documentation as scribed by the above signed scribe in my presence, and it is both accurate and complete to the best of our ability and knowledge. I agree with the details independently gathered by my clinical support staff, while the remaining scribed note accurately describes my personal service to the patient. The above RN is working as a scribe for and in the presence of myself . Working as a scribe, the RN may have prepopulated components of this note with my historical intellectual property under my direct supervision. Any additions to this intellectual property were performed at my direction. Furthermore, the content and accuracy of this note have been reviewed by me to the best of my ability. This chart may be generated in part by using Dragon Dictation system and may contain errors related to that system including errors in grammar, punctuation, and spelling, as well as words and phrases that may be inappropriate. If there are any questions or concerns please feel free to contact the dictating provider for clarification.

## 2023-08-16 ENCOUNTER — PROCEDURE VISIT (OUTPATIENT)
Dept: AUDIOLOGY | Age: 43
End: 2023-08-16

## 2023-08-16 DIAGNOSIS — H93.13 TINNITUS, BILATERAL: ICD-10-CM

## 2023-08-16 DIAGNOSIS — H90.3 SENSORINEURAL HEARING LOSS, BILATERAL: Primary | ICD-10-CM

## 2023-08-16 PROCEDURE — NBSRV NON-BILLABLE SERVICE: Performed by: AUDIOLOGIST

## 2023-08-16 NOTE — PROGRESS NOTES
Sai Zamora   1980, 37 y.o. female   [id number]       Referring Provider: Grace Hansen DO   Referral Type: In an order in [facility]    Reason for Visit: Evaluation of suspected change in hearing    ADULT AUDIOLOGIC [address], [place] [address] Giovany Hough is a 37 y.o. female seen today, 8/16/2023 , for a recheck audiologic evaluation. AUDIOLOGIC AND OTHER PERTINENT MEDICAL HISTORY:      Sai Zamora noted possible decline in hearing, bilaterally. Per previous evaluation, she notes history of connective tissues disorder, progressive hearing loss, tinnitus, and family history of noise exposure. Patient has been wearing [employer] 70-R hearing aids since 12/19/2019; however unable to wear right due to constant static for the past two weeks. She also denies changes in cardiovascular health. No additional changes to otologic or medical history were reported. Sai Zamora denied otalgia, aural fullness, otorrhea, dizziness, imbalance, history of falls, history of occupational/recreational noise exposure, history of head trauma, and history of ear surgery. Date: 8/16/2023     IMPRESSIONS:      Today's results revealed a symmetric sensorineural hearing loss with fair word recognition, bilaterally. Hearing loss stable compared to previous 12/10/19 audiogram. Patient was seen for hearing aid check following hearing evaluation. ASSESSMENT AND FINDINGS:     Otoscopy revealed: Clear ear canals bilaterally    RIGHT EAR:  Hearing Sensitivity: Moderately-severe sloping to severe to profound sensorineural hearing loss  Speech Recognition Threshold: 70 dB HL  Word Recognition: Fair 72%, based on NU-6 25-word list at 100 dBHL using recorded speech stimuli. LEFT EAR:  Hearing Sensitivity: Moderately-severe sloping to severe to profound sensorineural hearing loss  Speech Recognition Threshold: 75 dB HL  Word Recognition: Fair 76%, based on NU-6 25-word list at 100 dBHL using recorded speech stimuli.

## 2023-08-16 NOTE — PROGRESS NOTES
Nishi Duffy  [birth year]78 y.o. female   [id number]    [address]    Nishi Duffy was seen today, 8/16/2023, for a hearing aid check appointment     PROCEDURES:     Otoscopy revealed: Clear ear canals bilaterally    Patient noted right devices has been producing constant static for ~ 2 weeks and both devices have been rapidly draining battery. Hearing aids were cleaned and checked. A listening check revealed good function of left device and static in right. Microphone and  ports were suctioned, domes and wax traps were changed, and devices completed a desiccant cycle through [employer]. Changed 2(M) . Post-cleaning listening check revealed continued static in right ear. Troubleshooting revealed right device needs to be sent in for out of warranty repair quoted at $290.00. Recommended both device repair due to concerns for battery health quoted at $290.00 per ear. Also discussed possibility of new device if patient had additional hearing aid benefit. Patient fit with clinical jan Ravi M-RT devices. She will return in 4 weeks to decide to repair devices or to discuss new technology. Connected devices to fitting software. Datalogging revealed 13 hours of use per day. PATIENT EDUCATION:     - Verbally and visually reviewed importance of consistent use and care and maintenance of devices. Information was verbally shared with patient during appointment. RECOMMENDATIONS:     Continue consistent hearing aid use  Return for hearing aid checks as needed  Retest hearing as medically indicated and/or sooner if a change in hearing is noted.   Contact audiologist with questions/concerns as needed    **No charge for today's appointment    [facility] [place], [place]  Audiologist

## 2023-09-06 ENCOUNTER — PROCEDURE VISIT (OUTPATIENT)
Dept: AUDIOLOGY | Age: 43
End: 2023-09-06

## 2023-09-06 DIAGNOSIS — H90.3 SENSORINEURAL HEARING LOSS, BILATERAL: Primary | ICD-10-CM

## 2023-09-06 DIAGNOSIS — H93.13 TINNITUS, BILATERAL: ICD-10-CM

## 2023-09-06 PROCEDURE — V5014 HEARING AID REPAIR/MODIFYING: HCPCS | Performed by: AUDIOLOGIST

## 2023-09-06 NOTE — PROGRESS NOTES
Elly Garcia  [birth year]89 y.o. female   [id number]    RIGHT EAR: /MODEL: Kenya Lind M70-R  SN: [id number]  EARMOLD/TUBING/WIRE/DOME: 2xM with small power domes    LEFT EAR: /MODELLatrellniesha Lind M70-R  SN: [id number]  EARMOLD/TUBING/WIRE/DOME: 2xM with small power domes    HAF: 12/19/2019  HA WARRANTY: 03/12/2022 (2 years)- (RIGHT WARRANTY- 8/21/2024)  TV CONNECTOR: SN- [id number]  TV CONNECTOR WARRANTY: 03/12/2021     BUTTONS: Volume control   PROGRAMS: [employer]  PHONE CONNECTIVITY: Aids paired to patient's phone. HEARING AID  REPAIR    Elly Garcia was seen today, 9/6/2023, for a hearing aid check appointment    PROCEDURES:     Otoscopy revealed: Clear ear canals bilaterally    Patient was seen for right hearing aid repair  she noted overall did well with clinic devices; however did not like \"retention locks\". Patient returned clinic loaners. Connected devices to fitting software. Re-programmed to user settings. Discussed possibility of repair of left device as needed (quoted $290.00). Also noted possible hearing aid benefit if needed. Patient reported understanding and will call with concerns/questions. PATIENT EDUCATION:     - Verbally and visually reviewed importance of consistent use and care and maintenance of devices. Information was verbally shared with patient during appointment. RECOMMENDATIONS:     Continue consistent hearing aid use  Return for hearing aid checks as needed  Retest hearing as medically indicated and/or sooner if a change in hearing is noted. Contact audiologist with questions/concerns as needed      **Patient paid $290.00 for out of warranty repair for today's appointment.     Lamar Haines  Audiologist

## 2023-09-26 DIAGNOSIS — I50.22 SYSTOLIC CHF, CHRONIC (HCC): ICD-10-CM

## 2023-09-26 RX ORDER — METOPROLOL SUCCINATE 25 MG/1
TABLET, EXTENDED RELEASE ORAL
Qty: 90 TABLET | Refills: 3 | Status: SHIPPED | OUTPATIENT
Start: 2023-09-26

## 2023-11-06 DIAGNOSIS — I21.3 ACUTE ST ELEVATION MYOCARDIAL INFARCTION (STEMI), UNSPECIFIED ARTERY (HCC): ICD-10-CM

## 2023-11-06 RX ORDER — ASPIRIN 81 MG/1
TABLET, CHEWABLE ORAL
Qty: 30 TABLET | Refills: 0 | Status: SHIPPED | OUTPATIENT
Start: 2023-11-06

## 2023-11-28 DIAGNOSIS — I25.42 CORONARY ARTERY DISSECTION: Primary | ICD-10-CM

## 2023-11-28 DIAGNOSIS — I21.3 ACUTE ST ELEVATION MYOCARDIAL INFARCTION (STEMI), UNSPECIFIED ARTERY (HCC): ICD-10-CM

## 2023-11-28 DIAGNOSIS — I50.22 SYSTOLIC CHF, CHRONIC (HCC): ICD-10-CM

## 2023-11-28 NOTE — TELEPHONE ENCOUNTER
Last ov: 11/18/22 JJP  Upcoming ov: 01/10/24 [facility]    CMP- 06/24/21  CBC- 06/24/21    Spoke with pt husbands, d/t her HIPAA form not having anyone listed couldn't speak with him about medications. Asked  to have pt call us back. He stated he will not do that and I can talk to him or not at all. Pt refill request for metoprolol 25mg was filled on 9/26/23 for a years worth and asa needs refilled. Pt has not had labs since '21. Labs ordered. Please relay to pt when call is returned.

## 2023-11-29 RX ORDER — METOPROLOL SUCCINATE 25 MG/1
TABLET, EXTENDED RELEASE ORAL
Qty: 90 TABLET | Refills: 3 | OUTPATIENT
Start: 2023-11-29

## 2023-11-29 RX ORDER — ASPIRIN 81 MG/1
TABLET, CHEWABLE ORAL
Qty: 30 TABLET | Refills: 0 | Status: SHIPPED | OUTPATIENT
Start: 2023-11-29

## 2023-11-29 NOTE — TELEPHONE ENCOUNTER
Pt's spouse called back. Spouse said due to pt having hearing issues we needed to speak with him. Instructed for pt to get labs. Spouse upset about having to fill out another HIPAA form. Told him this needs renewed every yr. Said pt could go on her MYCHART to fill out new form or stop in office. Spouse said he would have pt fill new form.  TY

## 2023-11-29 NOTE — TELEPHONE ENCOUNTER
Attempted to reach pt, left vm to call office back. When call is returned please relay info in last message.

## 2024-01-05 ENCOUNTER — HOSPITAL ENCOUNTER (OUTPATIENT)
Age: 44
Discharge: HOME | End: 2024-01-05
Payer: COMMERCIAL

## 2024-01-05 ENCOUNTER — TELEPHONE (OUTPATIENT)
Dept: CARDIOLOGY CLINIC | Age: 44
End: 2024-01-05

## 2024-01-05 ENCOUNTER — HOSPITAL ENCOUNTER (INPATIENT)
Dept: CARDIAC CATH/INVASIVE PROCEDURES | Age: 44
LOS: 2 days | Discharge: HOME | DRG: 287 | End: 2024-01-07
Attending: INTERNAL MEDICINE | Admitting: INTERNAL MEDICINE
Payer: COMMERCIAL

## 2024-01-05 ENCOUNTER — HOSPITAL ENCOUNTER (EMERGENCY)
Age: 44
Discharge: ANOTHER HOSPITAL | End: 2024-01-05
Attending: EMERGENCY MEDICINE
Payer: COMMERCIAL

## 2024-01-05 ENCOUNTER — APPOINTMENT (OUTPATIENT)
Dept: GENERAL RADIOLOGY | Age: 44
End: 2024-01-05
Payer: COMMERCIAL

## 2024-01-05 VITALS
SYSTOLIC BLOOD PRESSURE: 111 MMHG | WEIGHT: 198 LBS | BODY MASS INDEX: 30.01 KG/M2 | HEART RATE: 80 BPM | RESPIRATION RATE: 16 BRPM | DIASTOLIC BLOOD PRESSURE: 63 MMHG | TEMPERATURE: 97 F | HEIGHT: 68 IN | OXYGEN SATURATION: 99 %

## 2024-01-05 DIAGNOSIS — I24.9 ACS (ACUTE CORONARY SYNDROME) (HCC): Primary | ICD-10-CM

## 2024-01-05 DIAGNOSIS — I21.3 ACUTE ST ELEVATION MYOCARDIAL INFARCTION (STEMI), UNSPECIFIED ARTERY (HCC): ICD-10-CM

## 2024-01-05 DIAGNOSIS — R94.31 ACUTE ELECTROCARDIOGRAM CHANGES: ICD-10-CM

## 2024-01-05 DIAGNOSIS — I50.22 SYSTOLIC CHF, CHRONIC (HCC): ICD-10-CM

## 2024-01-05 PROBLEM — Z98.890 STATUS POST CARDIAC CATHETERIZATION: Status: ACTIVE | Noted: 2024-01-05

## 2024-01-05 PROBLEM — I25.42 DISSECTION OF CORONARY ARTERY: Status: ACTIVE | Noted: 2024-01-05

## 2024-01-05 PROBLEM — R07.9 CHEST PAIN WITH HIGH RISK OF ACUTE CORONARY SYNDROME: Status: ACTIVE | Noted: 2024-01-05

## 2024-01-05 LAB
ANION GAP SERPL CALCULATED.3IONS-SCNC: 11 MMOL/L (ref 3–16)
ANION GAP SERPL CALCULATED.3IONS-SCNC: 8 MMOL/L (ref 3–16)
BASOPHILS # BLD: 0.1 K/UL (ref 0–0.2)
BASOPHILS # BLD: 0.1 K/UL (ref 0–0.2)
BASOPHILS NFR BLD: 0.8 %
BASOPHILS NFR BLD: 0.8 %
BUN SERPL-MCNC: 17 MG/DL (ref 7–20)
BUN SERPL-MCNC: 18 MG/DL (ref 7–20)
CALCIUM SERPL-MCNC: 8.5 MG/DL (ref 8.3–10.6)
CALCIUM SERPL-MCNC: 8.8 MG/DL (ref 8.3–10.6)
CHLORIDE SERPL-SCNC: 104 MMOL/L (ref 99–110)
CHLORIDE SERPL-SCNC: 107 MMOL/L (ref 99–110)
CO2 SERPL-SCNC: 23 MMOL/L (ref 21–32)
CO2 SERPL-SCNC: 27 MMOL/L (ref 21–32)
CREAT SERPL-MCNC: 0.6 MG/DL (ref 0.6–1.1)
CREAT SERPL-MCNC: 0.7 MG/DL (ref 0.6–1.1)
DEPRECATED RDW RBC AUTO: 13.4 % (ref 12.4–15.4)
DEPRECATED RDW RBC AUTO: 13.4 % (ref 12.4–15.4)
EKG ATRIAL RATE: 75 BPM
EKG ATRIAL RATE: 78 BPM
EKG DIAGNOSIS: NORMAL
EKG DIAGNOSIS: NORMAL
EKG P AXIS: 65 DEGREES
EKG P AXIS: 89 DEGREES
EKG P-R INTERVAL: 162 MS
EKG P-R INTERVAL: 166 MS
EKG Q-T INTERVAL: 372 MS
EKG Q-T INTERVAL: 376 MS
EKG QRS DURATION: 70 MS
EKG QRS DURATION: 82 MS
EKG QTC CALCULATION (BAZETT): 419 MS
EKG QTC CALCULATION (BAZETT): 424 MS
EKG R AXIS: -18 DEGREES
EKG R AXIS: -7 DEGREES
EKG T AXIS: -29 DEGREES
EKG T AXIS: -8 DEGREES
EKG VENTRICULAR RATE: 75 BPM
EKG VENTRICULAR RATE: 78 BPM
EOSINOPHIL # BLD: 0.1 K/UL (ref 0–0.6)
EOSINOPHIL # BLD: 0.1 K/UL (ref 0–0.6)
EOSINOPHIL NFR BLD: 1.3 %
EOSINOPHIL NFR BLD: 1.4 %
GFR SERPLBLD CREATININE-BSD FMLA CKD-EPI: >60 ML/MIN/{1.73_M2}
GFR SERPLBLD CREATININE-BSD FMLA CKD-EPI: >60 ML/MIN/{1.73_M2}
GLUCOSE SERPL-MCNC: 152 MG/DL (ref 70–99)
GLUCOSE SERPL-MCNC: 84 MG/DL (ref 70–99)
HCT VFR BLD AUTO: 41.1 % (ref 36–48)
HCT VFR BLD AUTO: 43 % (ref 36–48)
HGB BLD-MCNC: 14.2 G/DL (ref 12–16)
HGB BLD-MCNC: 14.7 G/DL (ref 12–16)
LYMPHOCYTES # BLD: 2.7 K/UL (ref 1–5.1)
LYMPHOCYTES # BLD: 3 K/UL (ref 1–5.1)
LYMPHOCYTES NFR BLD: 36.3 %
LYMPHOCYTES NFR BLD: 38 %
MAGNESIUM SERPL-MCNC: 2.2 MG/DL (ref 1.8–2.4)
MCH RBC QN AUTO: 31.4 PG (ref 26–34)
MCH RBC QN AUTO: 31.6 PG (ref 26–34)
MCHC RBC AUTO-ENTMCNC: 34.2 G/DL (ref 31–36)
MCHC RBC AUTO-ENTMCNC: 34.4 G/DL (ref 31–36)
MCV RBC AUTO: 91.7 FL (ref 80–100)
MCV RBC AUTO: 91.9 FL (ref 80–100)
MONOCYTES # BLD: 0.5 K/UL (ref 0–1.3)
MONOCYTES # BLD: 0.7 K/UL (ref 0–1.3)
MONOCYTES NFR BLD: 6.9 %
MONOCYTES NFR BLD: 8.5 %
NEUTROPHILS # BLD: 3.8 K/UL (ref 1.7–7.7)
NEUTROPHILS # BLD: 4.3 K/UL (ref 1.7–7.7)
NEUTROPHILS NFR BLD: 53 %
NEUTROPHILS NFR BLD: 53 %
PLATELET # BLD AUTO: 249 K/UL (ref 135–450)
PLATELET # BLD AUTO: 255 K/UL (ref 135–450)
PMV BLD AUTO: 7.8 FL (ref 5–10.5)
PMV BLD AUTO: 8.4 FL (ref 5–10.5)
POTASSIUM SERPL-SCNC: 3.5 MMOL/L (ref 3.5–5.1)
POTASSIUM SERPL-SCNC: 4 MMOL/L (ref 3.5–5.1)
RBC # BLD AUTO: 4.47 M/UL (ref 4–5.2)
RBC # BLD AUTO: 4.69 M/UL (ref 4–5.2)
SODIUM SERPL-SCNC: 138 MMOL/L (ref 136–145)
SODIUM SERPL-SCNC: 142 MMOL/L (ref 136–145)
TROPONIN, HIGH SENSITIVITY: 14 NG/L (ref 0–14)
WBC # BLD AUTO: 7.1 K/UL (ref 4–11)
WBC # BLD AUTO: 8.2 K/UL (ref 4–11)

## 2024-01-05 PROCEDURE — 6360000002 HC RX W HCPCS: Performed by: INTERNAL MEDICINE

## 2024-01-05 PROCEDURE — 99285 EMERGENCY DEPT VISIT HI MDM: CPT

## 2024-01-05 PROCEDURE — 92979 ENDOLUMINL IVUS OCT C EA: CPT | Performed by: INTERNAL MEDICINE

## 2024-01-05 PROCEDURE — C1769 GUIDE WIRE: HCPCS | Performed by: INTERNAL MEDICINE

## 2024-01-05 PROCEDURE — B2111ZZ FLUOROSCOPY OF MULTIPLE CORONARY ARTERIES USING LOW OSMOLAR CONTRAST: ICD-10-PCS | Performed by: INTERNAL MEDICINE

## 2024-01-05 PROCEDURE — 6360000002 HC RX W HCPCS: Performed by: EMERGENCY MEDICINE

## 2024-01-05 PROCEDURE — 93010 ELECTROCARDIOGRAM REPORT: CPT | Performed by: INTERNAL MEDICINE

## 2024-01-05 PROCEDURE — C1894 INTRO/SHEATH, NON-LASER: HCPCS | Performed by: INTERNAL MEDICINE

## 2024-01-05 PROCEDURE — 85025 COMPLETE CBC W/AUTO DIFF WBC: CPT

## 2024-01-05 PROCEDURE — 6360000002 HC RX W HCPCS

## 2024-01-05 PROCEDURE — B241ZZ3 ULTRASONOGRAPHY OF MULTIPLE CORONARY ARTERIES, INTRAVASCULAR: ICD-10-PCS | Performed by: INTERNAL MEDICINE

## 2024-01-05 PROCEDURE — 2000000000 HC ICU R&B

## 2024-01-05 PROCEDURE — 83735 ASSAY OF MAGNESIUM: CPT

## 2024-01-05 PROCEDURE — 80048 BASIC METABOLIC PNL TOTAL CA: CPT

## 2024-01-05 PROCEDURE — 96374 THER/PROPH/DIAG INJ IV PUSH: CPT

## 2024-01-05 PROCEDURE — 99152 MOD SED SAME PHYS/QHP 5/>YRS: CPT | Performed by: INTERNAL MEDICINE

## 2024-01-05 PROCEDURE — B2151ZZ FLUOROSCOPY OF LEFT HEART USING LOW OSMOLAR CONTRAST: ICD-10-PCS | Performed by: INTERNAL MEDICINE

## 2024-01-05 PROCEDURE — 6370000000 HC RX 637 (ALT 250 FOR IP): Performed by: INTERNAL MEDICINE

## 2024-01-05 PROCEDURE — C1887 CATHETER, GUIDING: HCPCS | Performed by: INTERNAL MEDICINE

## 2024-01-05 PROCEDURE — 36415 COLL VENOUS BLD VENIPUNCTURE: CPT

## 2024-01-05 PROCEDURE — 2500000003 HC RX 250 WO HCPCS

## 2024-01-05 PROCEDURE — 92978 ENDOLUMINL IVUS OCT C 1ST: CPT

## 2024-01-05 PROCEDURE — 99222 1ST HOSP IP/OBS MODERATE 55: CPT | Performed by: INTERNAL MEDICINE

## 2024-01-05 PROCEDURE — 2580000003 HC RX 258: Performed by: INTERNAL MEDICINE

## 2024-01-05 PROCEDURE — 71046 X-RAY EXAM CHEST 2 VIEWS: CPT

## 2024-01-05 PROCEDURE — C1753 CATH, INTRAVAS ULTRASOUND: HCPCS | Performed by: INTERNAL MEDICINE

## 2024-01-05 PROCEDURE — 2709999900 HC NON-CHARGEABLE SUPPLY: Performed by: INTERNAL MEDICINE

## 2024-01-05 PROCEDURE — 92978 ENDOLUMINL IVUS OCT C 1ST: CPT | Performed by: INTERNAL MEDICINE

## 2024-01-05 PROCEDURE — 93458 L HRT ARTERY/VENTRICLE ANGIO: CPT

## 2024-01-05 PROCEDURE — 4A023N7 MEASUREMENT OF CARDIAC SAMPLING AND PRESSURE, LEFT HEART, PERCUTANEOUS APPROACH: ICD-10-PCS | Performed by: INTERNAL MEDICINE

## 2024-01-05 PROCEDURE — 93458 L HRT ARTERY/VENTRICLE ANGIO: CPT | Performed by: INTERNAL MEDICINE

## 2024-01-05 PROCEDURE — 99223 1ST HOSP IP/OBS HIGH 75: CPT | Performed by: INTERNAL MEDICINE

## 2024-01-05 PROCEDURE — 84484 ASSAY OF TROPONIN QUANT: CPT

## 2024-01-05 PROCEDURE — 6370000000 HC RX 637 (ALT 250 FOR IP): Performed by: EMERGENCY MEDICINE

## 2024-01-05 PROCEDURE — 6360000004 HC RX CONTRAST MEDICATION

## 2024-01-05 PROCEDURE — 93005 ELECTROCARDIOGRAM TRACING: CPT | Performed by: EMERGENCY MEDICINE

## 2024-01-05 RX ORDER — ATORVASTATIN CALCIUM 40 MG/1
40 TABLET, FILM COATED ORAL DAILY
COMMUNITY

## 2024-01-05 RX ORDER — NITROGLYCERIN 20 MG/100ML
5-200 INJECTION INTRAVENOUS CONTINUOUS
Status: DISCONTINUED | OUTPATIENT
Start: 2024-01-05 | End: 2024-01-07 | Stop reason: HOSPADM

## 2024-01-05 RX ORDER — SODIUM CHLORIDE 9 MG/ML
INJECTION, SOLUTION INTRAVENOUS CONTINUOUS
Status: ACTIVE | OUTPATIENT
Start: 2024-01-05 | End: 2024-01-05

## 2024-01-05 RX ORDER — FENTANYL CITRATE 50 UG/ML
INJECTION, SOLUTION INTRAMUSCULAR; INTRAVENOUS
Status: COMPLETED | OUTPATIENT
Start: 2024-01-05 | End: 2024-01-05

## 2024-01-05 RX ORDER — HEPARIN SODIUM 1000 [USP'U]/ML
4000 INJECTION, SOLUTION INTRAVENOUS; SUBCUTANEOUS PRN
Status: DISCONTINUED | OUTPATIENT
Start: 2024-01-05 | End: 2024-01-05 | Stop reason: HOSPADM

## 2024-01-05 RX ORDER — LISINOPRIL 2.5 MG/1
2.5 TABLET ORAL DAILY
Status: ON HOLD | COMMUNITY
End: 2024-01-07 | Stop reason: HOSPADM

## 2024-01-05 RX ORDER — SODIUM CHLORIDE 0.9 % (FLUSH) 0.9 %
5-40 SYRINGE (ML) INJECTION EVERY 12 HOURS SCHEDULED
Status: DISCONTINUED | OUTPATIENT
Start: 2024-01-05 | End: 2024-01-07 | Stop reason: HOSPADM

## 2024-01-05 RX ORDER — NITROGLYCERIN 20 MG/100ML
INJECTION INTRAVENOUS CONTINUOUS PRN
Status: COMPLETED | OUTPATIENT
Start: 2024-01-05 | End: 2024-01-05

## 2024-01-05 RX ORDER — ACETAMINOPHEN 325 MG/1
650 TABLET ORAL EVERY 6 HOURS PRN
Status: DISCONTINUED | OUTPATIENT
Start: 2024-01-05 | End: 2024-01-07 | Stop reason: HOSPADM

## 2024-01-05 RX ORDER — SODIUM CHLORIDE 9 MG/ML
INJECTION, SOLUTION INTRAVENOUS PRN
Status: DISCONTINUED | OUTPATIENT
Start: 2024-01-05 | End: 2024-01-07 | Stop reason: HOSPADM

## 2024-01-05 RX ORDER — HEPARIN SODIUM 1000 [USP'U]/ML
4000 INJECTION, SOLUTION INTRAVENOUS; SUBCUTANEOUS ONCE
Status: COMPLETED | OUTPATIENT
Start: 2024-01-05 | End: 2024-01-05

## 2024-01-05 RX ORDER — METOPROLOL SUCCINATE 25 MG/1
25 TABLET, EXTENDED RELEASE ORAL DAILY
Status: DISCONTINUED | OUTPATIENT
Start: 2024-01-05 | End: 2024-01-07 | Stop reason: HOSPADM

## 2024-01-05 RX ORDER — ASPIRIN 81 MG/1
81 TABLET, CHEWABLE ORAL DAILY
Status: DISCONTINUED | OUTPATIENT
Start: 2024-01-05 | End: 2024-01-07 | Stop reason: HOSPADM

## 2024-01-05 RX ORDER — ACETAMINOPHEN 650 MG/1
650 SUPPOSITORY RECTAL EVERY 6 HOURS PRN
Status: DISCONTINUED | OUTPATIENT
Start: 2024-01-05 | End: 2024-01-07 | Stop reason: HOSPADM

## 2024-01-05 RX ORDER — SODIUM CHLORIDE 0.9 % (FLUSH) 0.9 %
5-40 SYRINGE (ML) INJECTION PRN
Status: DISCONTINUED | OUTPATIENT
Start: 2024-01-05 | End: 2024-01-07 | Stop reason: HOSPADM

## 2024-01-05 RX ORDER — MIDAZOLAM HYDROCHLORIDE 1 MG/ML
INJECTION INTRAMUSCULAR; INTRAVENOUS
Status: COMPLETED | OUTPATIENT
Start: 2024-01-05 | End: 2024-01-05

## 2024-01-05 RX ORDER — ONDANSETRON 4 MG/1
4 TABLET, ORALLY DISINTEGRATING ORAL EVERY 8 HOURS PRN
Status: DISCONTINUED | OUTPATIENT
Start: 2024-01-05 | End: 2024-01-07 | Stop reason: HOSPADM

## 2024-01-05 RX ORDER — POTASSIUM CHLORIDE 20 MEQ/1
40 TABLET, EXTENDED RELEASE ORAL PRN
Status: DISCONTINUED | OUTPATIENT
Start: 2024-01-05 | End: 2024-01-07 | Stop reason: HOSPADM

## 2024-01-05 RX ORDER — ONDANSETRON 2 MG/ML
4 INJECTION INTRAMUSCULAR; INTRAVENOUS EVERY 6 HOURS PRN
Status: DISCONTINUED | OUTPATIENT
Start: 2024-01-05 | End: 2024-01-07 | Stop reason: HOSPADM

## 2024-01-05 RX ORDER — HEPARIN SODIUM 1000 [USP'U]/ML
2000 INJECTION, SOLUTION INTRAVENOUS; SUBCUTANEOUS PRN
Status: DISCONTINUED | OUTPATIENT
Start: 2024-01-05 | End: 2024-01-05 | Stop reason: HOSPADM

## 2024-01-05 RX ORDER — POTASSIUM CHLORIDE 7.45 MG/ML
10 INJECTION INTRAVENOUS PRN
Status: DISCONTINUED | OUTPATIENT
Start: 2024-01-05 | End: 2024-01-07 | Stop reason: HOSPADM

## 2024-01-05 RX ORDER — POLYETHYLENE GLYCOL 3350 17 G/17G
17 POWDER, FOR SOLUTION ORAL DAILY PRN
Status: DISCONTINUED | OUTPATIENT
Start: 2024-01-05 | End: 2024-01-07 | Stop reason: HOSPADM

## 2024-01-05 RX ORDER — HYDRALAZINE HYDROCHLORIDE 20 MG/ML
10 INJECTION INTRAMUSCULAR; INTRAVENOUS EVERY 10 MIN PRN
Status: DISCONTINUED | OUTPATIENT
Start: 2024-01-05 | End: 2024-01-07 | Stop reason: HOSPADM

## 2024-01-05 RX ORDER — HEPARIN SODIUM 1000 [USP'U]/ML
INJECTION, SOLUTION INTRAVENOUS; SUBCUTANEOUS
Status: COMPLETED | OUTPATIENT
Start: 2024-01-05 | End: 2024-01-05

## 2024-01-05 RX ORDER — LISINOPRIL 2.5 MG/1
2.5 TABLET ORAL DAILY
Status: DISCONTINUED | OUTPATIENT
Start: 2024-01-05 | End: 2024-01-06

## 2024-01-05 RX ORDER — NITROGLYCERIN 0.4 MG/1
0.4 TABLET SUBLINGUAL ONCE
Status: COMPLETED | OUTPATIENT
Start: 2024-01-05 | End: 2024-01-05

## 2024-01-05 RX ORDER — LORAZEPAM 1 MG/1
1 TABLET ORAL ONCE
Status: COMPLETED | OUTPATIENT
Start: 2024-01-05 | End: 2024-01-05

## 2024-01-05 RX ORDER — HEPARIN SODIUM 10000 [USP'U]/100ML
11.1 INJECTION, SOLUTION INTRAVENOUS CONTINUOUS
Status: DISCONTINUED | OUTPATIENT
Start: 2024-01-05 | End: 2024-01-05 | Stop reason: HOSPADM

## 2024-01-05 RX ORDER — MORPHINE SULFATE 2 MG/ML
2 INJECTION, SOLUTION INTRAMUSCULAR; INTRAVENOUS EVERY 4 HOURS PRN
Status: DISCONTINUED | OUTPATIENT
Start: 2024-01-05 | End: 2024-01-07 | Stop reason: HOSPADM

## 2024-01-05 RX ORDER — UBIDECARENONE 200 MG
1 CAPSULE ORAL
COMMUNITY

## 2024-01-05 RX ORDER — MAGNESIUM SULFATE IN WATER 40 MG/ML
2000 INJECTION, SOLUTION INTRAVENOUS PRN
Status: DISCONTINUED | OUTPATIENT
Start: 2024-01-05 | End: 2024-01-07 | Stop reason: HOSPADM

## 2024-01-05 RX ORDER — ACETAMINOPHEN AND CODEINE PHOSPHATE 120; 12 MG/5ML; MG/5ML
1 SOLUTION ORAL DAILY
COMMUNITY
Start: 2023-10-13

## 2024-01-05 RX ORDER — EPTIFIBATIDE 0.75 MG/ML
2 INJECTION, SOLUTION INTRAVENOUS CONTINUOUS
Status: DISPENSED | OUTPATIENT
Start: 2024-01-05 | End: 2024-01-06

## 2024-01-05 RX ORDER — ASPIRIN 81 MG/1
81 TABLET ORAL DAILY
COMMUNITY

## 2024-01-05 RX ORDER — ATORVASTATIN CALCIUM 40 MG/1
40 TABLET, FILM COATED ORAL NIGHTLY
Status: DISCONTINUED | OUTPATIENT
Start: 2024-01-05 | End: 2024-01-07 | Stop reason: HOSPADM

## 2024-01-05 RX ADMIN — METOPROLOL SUCCINATE 25 MG: 25 TABLET, EXTENDED RELEASE ORAL at 18:18

## 2024-01-05 RX ADMIN — HEPARIN SODIUM 11.1 UNITS/KG/HR: 10000 INJECTION, SOLUTION INTRAVENOUS at 10:15

## 2024-01-05 RX ADMIN — NITROGLYCERIN 0.4 MG: 0.4 TABLET SUBLINGUAL at 10:06

## 2024-01-05 RX ADMIN — MIDAZOLAM HYDROCHLORIDE 1 MG: 1 INJECTION INTRAMUSCULAR; INTRAVENOUS at 11:21

## 2024-01-05 RX ADMIN — EPTIFIBATIDE 2 MCG/KG/MIN: 0.75 INJECTION INTRAVENOUS at 16:43

## 2024-01-05 RX ADMIN — TICAGRELOR 180 MG: 90 TABLET ORAL at 10:07

## 2024-01-05 RX ADMIN — FENTANYL CITRATE 25 MCG: 50 INJECTION, SOLUTION INTRAMUSCULAR; INTRAVENOUS at 11:21

## 2024-01-05 RX ADMIN — LORAZEPAM 1 MG: 1 TABLET ORAL at 10:15

## 2024-01-05 RX ADMIN — FENTANYL CITRATE 25 MCG: 50 INJECTION, SOLUTION INTRAMUSCULAR; INTRAVENOUS at 11:00

## 2024-01-05 RX ADMIN — HEPARIN SODIUM 4500 UNITS: 1000 INJECTION, SOLUTION INTRAVENOUS; SUBCUTANEOUS at 11:19

## 2024-01-05 RX ADMIN — SODIUM CHLORIDE: 9 INJECTION, SOLUTION INTRAVENOUS at 18:24

## 2024-01-05 RX ADMIN — NITROGLYCERIN 10 MCG/MIN: 20 INJECTION INTRAVENOUS at 14:15

## 2024-01-05 RX ADMIN — EPTIFIBATIDE 2 MCG/KG/MIN: 0.75 INJECTION INTRAVENOUS at 11:47

## 2024-01-05 RX ADMIN — NITROGLYCERIN 10 MCG/MIN: 20 INJECTION INTRAVENOUS at 11:52

## 2024-01-05 RX ADMIN — ATORVASTATIN CALCIUM 40 MG: 40 TABLET, FILM COATED ORAL at 20:00

## 2024-01-05 RX ADMIN — MIDAZOLAM HYDROCHLORIDE 2 MG: 1 INJECTION INTRAMUSCULAR; INTRAVENOUS at 11:00

## 2024-01-05 RX ADMIN — HEPARIN SODIUM 4000 UNITS: 1000 INJECTION INTRAVENOUS; SUBCUTANEOUS at 10:07

## 2024-01-05 ASSESSMENT — PAIN SCALES - GENERAL: PAINLEVEL_OUTOF10: 5

## 2024-01-05 ASSESSMENT — PAIN DESCRIPTION - LOCATION: LOCATION: CHEST

## 2024-01-05 ASSESSMENT — LIFESTYLE VARIABLES: HOW OFTEN DO YOU HAVE A DRINK CONTAINING ALCOHOL: MONTHLY OR LESS

## 2024-01-05 ASSESSMENT — PAIN - FUNCTIONAL ASSESSMENT: PAIN_FUNCTIONAL_ASSESSMENT: 0-10

## 2024-01-05 NOTE — CONSULTS
[facility]   Cardiovascular Evaluation    PATIENT: Norma Dave  DATE: 2024  MRN: [id number]  CSN: [id number]  : 1980    Primary Care Doctor/Referring provider: Dr. Hardy of [facility] emergency room.    Reason for evaluation/Chief complaint:   Acute chest pain    Subjective:    History of present illness on initial date of evaluation:   Norma Dave is a 43 y.o. patient who presents as a emergent referral for interventional cardiology.  The patient presented to [facility] with acute onset of chest pressure in the anterior chest that radiated to her back area.  This was a heavy sensation that was mild at first but then progressed to more severe.  This prompted her to seek medical evaluation within the emergency room.  She was seen and evaluated at [facility] and had an EKG consistent with ST segment depression in the anterior leads along with classic anginal symptoms.  Patient was urgently evaluated in underwent phone consultation with interventional cardiology.  After discussion with the emergency room team, who elected to have the patient transferred to [facility] for further clinical evaluation and emergent cardiac catheterization.   The patient had a previous known history of sudden coronary artery dissection in .  Reviewing her films she had a coronary artery dissection involving the mid left anterior descending artery involving and causing an anterior myocardial infarction.  She was treated conservatively and had resolution of her clinical picture and stabilization without undergoing interventional treatment.  She had been following with our cardiovascular practice and had seen Dr. Lazaro Guzman most recently in 2022.      Patient Active Problem List   Diagnosis    Acute ST elevation myocardial infarction (STEMI) (HCC)    Coronary artery dissection    Hypomagnesemia    Bilateral hearing loss    Ischemic

## 2024-01-05 NOTE — PROCEDURES
[facility]       Cardiac Catheterization Lab Report    PATIENT: Norma Dave  DATE: 2024  MRN: [id number]  CSN: [id number]  : 1980      Performing Physician: Kaley Grissom MD, SAMANTHA, [facility], [facility]  Primary Care Physician: Pedrito Hansen DO  Admitting Provider: Kaley Grissom MD    Procedures Performed:   1. Left heart catheterization  2. Selective left and right coronary angiogram  3. Left ventriculography   4. IVUS of the LM/LAD    Procedure Findings:  1.  Coronary artery dissection of the proximal left anterior descending artery and first diagonal branch.  2.  Reduced left ventricular function with evidence of anterior hypokinesis and ejection fraction of 40%  3. Normal left heart hemodynamics    Indications:   Patient Active Problem List   Diagnosis    Acute ST elevation myocardial infarction (STEMI) ([facility])    Coronary artery dissection    Hypomagnesemia    Bilateral hearing loss    Ischemic cardiomyopathy    NSVT (nonsustained ventricular tachycardia) ([facility])    Sensorineural hearing loss, bilateral    Palpitations    Other chest pain    Edema of both legs    Status post cardiac catheterization    Chest pain with high risk of acute coronary syndrome       Details:   Norma Dave was brought to the cardiac catheterization lab in a fasting state after informed consent was obtained. If the patient was able to provide written consent, it was obtained.   The patient's vitals were monitored through out the procedure. The patient was sedated using the appropriate levels of sedation and ASA guidelines.   The appropriate access site area was prepped and drapped in a sterile fashion. The area was anesthetized with 2% lidocaine. Using the modified Seldinger technique, an arterial sheath was introduced into the arterial access site using a single anterior wall puncture. The sheath was flushed and prepped in usual fashion.   Catheters used during the procedure included 5 [place] TIG

## 2024-01-05 NOTE — H&P
V2.0  History and Physical      Name:  Norma Dave /Age/Sex: 1980  (43 y.o. female)   MRN & CSN:  [id number] & [id number] Encounter Date/Time: 2024 2:58 PM EST   Location:   PCP: Pedrito Hansen DO       Hospital Day: 1    Assessment and Plan:   Norma Dave is a 43 y.o. 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  -Admitted to ICU  -Started on nitroglycerin for vascular spasm.  -Continue Integrilin drip  -Consult critical care    HFrEF  Ischemic cardiomyopathy  Found to have reduced LV function and anterior hypokinesis and ejection fraction of 40%  Continue GDMT as tolerated    Bilateral sensorineural hearing loss      Disposition:   Current Living situation:   Expected Disposition:   Estimated D/C:     Diet ADULT DIET; Regular   DVT Prophylaxis [] Lovenox, []  Heparin, [] SCDs, [] Ambulation,  [] Eliquis, [] Xarelto, [] Coumadin   Code Status Full Code   Surrogate Decision Maker/ POA      History from:     patient    History of Present Illness:     Chief Complaint: Dissection of coronary artery  Norma Dave is a 43 y.o. female with pmh of spontaneous coronary artery dissection, ischemic cardiomyopathy, sensorineural hearing loss who presents with chest pain.  Patient was initially seen at [facility] ED.  Her chest pain started

## 2024-01-05 NOTE — CONSULTS
INPATIENT PULMONARY CRITICAL CARE CONSULT NOTE      Chief Complaint/Referring Provider:  Patient is being seen at the request of Dr. Vilchis for a consultation for LAD dissection     Presenting HPI: Patient came to the hospital because of acute chest pain      Norma Dave is a 43 y.o. patient who presents as a emergent referral for interventional cardiology.  The patient presented to [facility] with acute onset of chest pressure in the anterior chest that radiated to her back area.  This was a heavy sensation that was mild at first but then progressed to more severe.  This prompted her to seek medical evaluation within the emergency room.  She was seen and evaluated at [facility] and had an EKG consistent with ST segment depression in the anterior leads along with classic anginal symptoms.  Patient was urgently evaluated in underwent phone consultation with interventional cardiology.  After discussion with the emergency room team, who elected to have the patient transferred to [facility] for further clinical evaluation and emergent cardiac catheterization.              The patient had a previous known history of sudden coronary artery dissection in 2019.  Reviewing her films she had a coronary artery dissection involving the mid left anterior descending artery involving and causing an anterior myocardial infarction.  She was treated conservatively and had resolution of her clinical picture and stabilization without undergoing interventional treatment.  She had been following with our cardiovascular practice and had seen Dr. Lazaro Guzman most recently in November 2022.    Patient underwent cardiac cath-Procedures Performed:   1. Left heart catheterization  2. Selective left and right coronary angiogram  3. Left ventriculography   4. IVUS of the LM/LAD     Procedure Findings:  1.  Coronary artery dissection of the proximal left anterior descending artery and first diagonal

## 2024-01-05 NOTE — POST SEDATION
Patient:  Norma Daev   :   1980    A pre-sedation re-evaluation was performed immediately at the end of the procedure.  Procedure:  Cardiac cath  Medications: Procedural sedation with minimal conscious sedation  Complications: None  Estimated Blood Loss: none  Specimens: Were not obtained        Bhaskar Medication and Procedural Reconciliation:  I agree that the documented medications and procedures performed are true.  The medications were given under my order.  The procedures were performed under my direct supervision.

## 2024-01-05 NOTE — ED NOTES
0945- Call to [facility] for interventional cardiology consult for Code STEMI per Dr. Julian.   Lory reports to add Dr. Moulton to pt tx team and she will page interventional at [facility].     0949- Dr. Grissom with callback for interventional cardiology consult and connected to Dr. Julian at this time.     0955- Dr. Julian reports pt will need emergent transport to [facility] Cath lab.   0955- Call to  [facility] at this time, reports 15 minute ETA.  Charge RN and security aware.

## 2024-01-05 NOTE — PRE SEDATION
Brief Pre-Op Note/Sedation Assessment      Norma Dave  1980  [id number]  11:55 AM    Planned Procedure: Cardiac Catheterization Procedure  Post Procedure Plan: Return to same level of care  Consent: I have discussed with the patient and/or the patient representative the indication, alternatives, and the possible risks and/or complications of the planned procedure and the anesthesia methods. The patient and/or patient representative appear to understand and agree to proceed.        Chief Complaint:   Chest Pain/Pressure  NSTEMI      Indications for Cath Procedure:  Presentation:  ACS <= 24 hrs  2.  Anginal Classification within 2 weeks:  CCS IV - Inability to perform any activity without angina or angina at rest, i.e., severe limitation  3.  Angina Symptoms Assessment:  Typical Chest Pain  4.  Heart Failure Class within last 2 weeks:  Yes:  Heart Failure Type: Unknown Severity:  Class III - Symptoms of HF on less-than-ordinary exertion  5.  Cardiovascular Instability:  Yes:  Persistent ischemic symptoms (CP, ST Elevation)    Prior Ischemic Workup/Eval:  Pre-Procedural Medications: Yes: ACE/ARB/ARNI, Aspirin, Beta Blockers, and STATIN  2.   Stress Test Completed?  No    Does Patient need surgery?  Cath Valve Surgery:  No    Pre-Procedure Medical History:  Vital Signs:  There were no vitals taken for this visit.    Allergies:    Allergies   Allergen Reactions    Codeine      Medications:    Current Facility-Administered Medications   Medication Dose Route Frequency Provider Last Rate Last Admin    eptifibatide (INTEGRILIN) 0.75 mg/mL infusion  2 mcg/kg/min IntraVENous Continuous Kaley Grissom MD 14.4 mL/hr at 01/05/24 1147 2 mcg/kg/min at 01/05/24 1147       Past Medical History:    Past Medical History:   Diagnosis Date    Allergic rhinitis     GERD (gastroesophageal reflux disease)     Headache     Hearing disorder     Ischemic cardiomyopathy 11/2019    due to Spontaneous coronary artery dissection

## 2024-01-05 NOTE — TELEPHONE ENCOUNTER
----- Message from Pedrito Hansen DO sent at 1/5/2024  3:50 PM EST -----  Please let patient know that her kidney function, electrolytes, and blood counts are within normal limits.    Will forward to Dr. Grissom for review.

## 2024-01-05 NOTE — ED PROVIDER NOTES
[facility] ED  EMERGENCY DEPARTMENT ENCOUNTER        Patient Name: Norma Dvae  MRN: [id number]  Birthdate 1980  Date of evaluation: 1/5/2024  Provider: Antony Julian MD  PCP: Pedrito Hansen DO  Note Started: 9:28 AM EST 1/5/24    CHIEF COMPLAINT       Chest Pain (Started this morning tightness in chest )      HISTORY OF PRESENT ILLNESS: 1 or more Elements     History from : Patient    Limitations to history : None    Norma Dave is a 43 y.o. female who presents for evaluation of chest pain.  Patient states that she had chest pain that started around 8 AM or so this morning.  She describes it in the center of her chest, as a crushing pressure-like sensation.  She has had no difficulty breathing.  She states the pain is worse with exertion.  She has had prior history of coronary artery spasm leading to anterior STEMI.  She is on aspirin, she took full dose aspirin prior to arrival.    Nursing Notes were all reviewed and agreed with or any disagreements were addressed in the HPI.    REVIEW OF SYSTEMS :      Review of Systems    Positives and Pertinent negatives as per HPI.     SURGICAL HISTORY     Past Surgical History:   Procedure Laterality Date    CARDIAC CATHETERIZATION  11/26/2019    KNEE SURGERY         CURRENTMEDICATIONS       Previous Medications    ASPIRIN (ASPIRIN LOW DOSE) 81 MG CHEWABLE TABLET    TAKE 1 TABLET BY MOUTH EVERY DAY    ATORVASTATIN (LIPITOR) 40 MG TABLET    TAKE 1 TABLET BY MOUTH EVERY DAY AT NIGHT    COENZYME Q10 (COQ10 PO)    Take by mouth    LISINOPRIL (PRINIVIL;ZESTRIL) 2.5 MG TABLET    TAKE 1 TABLET BY MOUTH EVERY DAY    METOPROLOL SUCCINATE (TOPROL XL) 25 MG EXTENDED RELEASE TABLET    TAKE 1 TABLET BY MOUTH EVERY DAY    NORETHINDRONE PO    Take by mouth    VITAMIN D (ERGOCALCIFEROL) 1.25 MG (98103 UT) CAPS CAPSULE    TAKE 1 CAPSULE BY MOUTH ONCE A WEEK ON MONDAY       ALLERGIES     Codeine    FAMILYHISTORY       Family History   Problem Relation Age of  range  Intervals and Durations are unremarkable.      There is slight ST elevation in lead aVL, there is ST depression, and V2 through V6 which is new when compared to prior EKG, aVL elevation is new when compared to prior EKG, prior EKG dated November 20 at bedtime and 19 and was during her prior acute coronary syndrome      RADIOLOGY:   Non-plain film images such as CT, Ultrasound and MRI are read by the radiologist. Plain radiographic images are visualized and preliminarily interpreted by the ED Provider with the below findings:        Interpretation per the Radiologist below, if available at the time of this note:    XR CHEST (2 VW)   Final Result   No acute process.           No results found.      Bedside Ultrasound, as interpreted by me, if performed:    No results found.    PROCEDURES     Unless otherwise noted below, none     Procedures    CRITICAL CARE TIME     I personally spent a total of 35 minutes of critical care time in obtaining history, performing a physical exam, bedside monitoring of interventions, collecting and interpreting tests and discussion with consultants but excluding time spent performing procedures, treating other patients and teaching time.                                                                                                           PAST MEDICAL HISTORY      has a past medical history of Allergic rhinitis, GERD (gastroesophageal reflux disease), Headache, Hearing disorder, Ischemic cardiomyopathy (11/2019), STEMI (ST elevation myocardial infarction) ([facility]) (11/2019), and Tinnitus.     EMERGENCY DEPARTMENT COURSE and DIFFERENTIAL DIAGNOSIS/MDM:     Vitals:    Vitals:    01/05/24 0936 01/05/24 0958   BP: 111/63    Pulse: 80    Resp: 16    Temp: 97 °F (36.1 °C)    SpO2: 99%    Weight:  89.8 kg (198 lb)   Height: 1.727 m (5' 8\")        Patient was treated with and given the following medications:  Medications   heparin (porcine) injection 4,000 Units (has no administration in

## 2024-01-06 LAB
ANION GAP SERPL CALCULATED.3IONS-SCNC: 9 MMOL/L (ref 3–16)
BASOPHILS # BLD: 0 K/UL (ref 0–0.2)
BASOPHILS NFR BLD: 0.6 %
BUN SERPL-MCNC: 10 MG/DL (ref 7–20)
CALCIUM SERPL-MCNC: 8.7 MG/DL (ref 8.3–10.6)
CHLORIDE SERPL-SCNC: 105 MMOL/L (ref 99–110)
CO2 SERPL-SCNC: 24 MMOL/L (ref 21–32)
CREAT SERPL-MCNC: 0.6 MG/DL (ref 0.6–1.1)
DEPRECATED RDW RBC AUTO: 13.3 % (ref 12.4–15.4)
EOSINOPHIL # BLD: 0.1 K/UL (ref 0–0.6)
EOSINOPHIL NFR BLD: 0.8 %
GFR SERPLBLD CREATININE-BSD FMLA CKD-EPI: >60 ML/MIN/{1.73_M2}
GLUCOSE SERPL-MCNC: 96 MG/DL (ref 70–99)
HCT VFR BLD AUTO: 40.1 % (ref 36–48)
HGB BLD-MCNC: 13.9 G/DL (ref 12–16)
LYMPHOCYTES # BLD: 1.5 K/UL (ref 1–5.1)
LYMPHOCYTES NFR BLD: 17.6 %
MCH RBC QN AUTO: 32.3 PG (ref 26–34)
MCHC RBC AUTO-ENTMCNC: 34.7 G/DL (ref 31–36)
MCV RBC AUTO: 92.9 FL (ref 80–100)
MONOCYTES # BLD: 0.7 K/UL (ref 0–1.3)
MONOCYTES NFR BLD: 8.5 %
NEUTROPHILS # BLD: 6.3 K/UL (ref 1.7–7.7)
NEUTROPHILS NFR BLD: 72.5 %
PLATELET # BLD AUTO: 233 K/UL (ref 135–450)
PMV BLD AUTO: 8 FL (ref 5–10.5)
POTASSIUM SERPL-SCNC: 4.3 MMOL/L (ref 3.5–5.1)
RBC # BLD AUTO: 4.32 M/UL (ref 4–5.2)
SODIUM SERPL-SCNC: 138 MMOL/L (ref 136–145)
WBC # BLD AUTO: 8.7 K/UL (ref 4–11)

## 2024-01-06 PROCEDURE — C8929 TTE W OR WO FOL WCON,DOPPLER: HCPCS

## 2024-01-06 PROCEDURE — 85025 COMPLETE CBC W/AUTO DIFF WBC: CPT

## 2024-01-06 PROCEDURE — 80048 BASIC METABOLIC PNL TOTAL CA: CPT

## 2024-01-06 PROCEDURE — 2000000000 HC ICU R&B

## 2024-01-06 PROCEDURE — 6360000004 HC RX CONTRAST MEDICATION: Performed by: INTERNAL MEDICINE

## 2024-01-06 PROCEDURE — 2580000003 HC RX 258: Performed by: INTERNAL MEDICINE

## 2024-01-06 PROCEDURE — 6370000000 HC RX 637 (ALT 250 FOR IP): Performed by: INTERNAL MEDICINE

## 2024-01-06 PROCEDURE — 36415 COLL VENOUS BLD VENIPUNCTURE: CPT

## 2024-01-06 PROCEDURE — 99232 SBSQ HOSP IP/OBS MODERATE 35: CPT | Performed by: INTERNAL MEDICINE

## 2024-01-06 RX ORDER — LOSARTAN POTASSIUM 25 MG/1
25 TABLET ORAL DAILY
Status: DISCONTINUED | OUTPATIENT
Start: 2024-01-07 | End: 2024-01-07 | Stop reason: HOSPADM

## 2024-01-06 RX ADMIN — METOPROLOL SUCCINATE 25 MG: 25 TABLET, EXTENDED RELEASE ORAL at 09:01

## 2024-01-06 RX ADMIN — ASPIRIN 81 MG: 81 TABLET, CHEWABLE ORAL at 09:01

## 2024-01-06 RX ADMIN — Medication 10 ML: at 21:02

## 2024-01-06 RX ADMIN — PERFLUTREN 1.5 ML: 6.52 INJECTION, SUSPENSION INTRAVENOUS at 07:11

## 2024-01-06 RX ADMIN — ATORVASTATIN CALCIUM 40 MG: 40 TABLET, FILM COATED ORAL at 21:03

## 2024-01-06 RX ADMIN — Medication 10 ML: at 09:00

## 2024-01-06 RX ADMIN — Medication 10 ML: at 21:06

## 2024-01-06 RX ADMIN — LISINOPRIL 2.5 MG: 2.5 TABLET ORAL at 09:01

## 2024-01-06 RX ADMIN — Medication 10 ML: at 09:03

## 2024-01-06 ASSESSMENT — PAIN SCALES - GENERAL
PAINLEVEL_OUTOF10: 0

## 2024-01-06 NOTE — PLAN OF CARE
Problem: Discharge Planning  Goal: Discharge to home or other facility with appropriate resources  Outcome: Progressing     Problem: Pain  Goal: Verbalizes/displays adequate comfort level or baseline comfort level  Outcome: Progressing     Teofilo Boyle RN

## 2024-01-07 VITALS
HEART RATE: 78 BPM | RESPIRATION RATE: 18 BRPM | DIASTOLIC BLOOD PRESSURE: 55 MMHG | WEIGHT: 199.3 LBS | SYSTOLIC BLOOD PRESSURE: 93 MMHG | TEMPERATURE: 98.3 F | BODY MASS INDEX: 30.3 KG/M2 | OXYGEN SATURATION: 99 %

## 2024-01-07 LAB
ANION GAP SERPL CALCULATED.3IONS-SCNC: 10 MMOL/L (ref 3–16)
BASOPHILS # BLD: 0 K/UL (ref 0–0.2)
BASOPHILS NFR BLD: 0.5 %
BUN SERPL-MCNC: 12 MG/DL (ref 7–20)
CALCIUM SERPL-MCNC: 8.9 MG/DL (ref 8.3–10.6)
CHLORIDE SERPL-SCNC: 106 MMOL/L (ref 99–110)
CO2 SERPL-SCNC: 25 MMOL/L (ref 21–32)
CREAT SERPL-MCNC: 0.7 MG/DL (ref 0.6–1.1)
DEPRECATED RDW RBC AUTO: 13.4 % (ref 12.4–15.4)
EOSINOPHIL # BLD: 0.1 K/UL (ref 0–0.6)
EOSINOPHIL NFR BLD: 1.6 %
GFR SERPLBLD CREATININE-BSD FMLA CKD-EPI: >60 ML/MIN/{1.73_M2}
GLUCOSE SERPL-MCNC: 109 MG/DL (ref 70–99)
HCT VFR BLD AUTO: 42 % (ref 36–48)
HGB BLD-MCNC: 14.3 G/DL (ref 12–16)
LYMPHOCYTES # BLD: 2 K/UL (ref 1–5.1)
LYMPHOCYTES NFR BLD: 25.4 %
MCH RBC QN AUTO: 31.6 PG (ref 26–34)
MCHC RBC AUTO-ENTMCNC: 34 G/DL (ref 31–36)
MCV RBC AUTO: 92.9 FL (ref 80–100)
MONOCYTES # BLD: 0.7 K/UL (ref 0–1.3)
MONOCYTES NFR BLD: 8.7 %
NEUTROPHILS # BLD: 5 K/UL (ref 1.7–7.7)
NEUTROPHILS NFR BLD: 63.8 %
PLATELET # BLD AUTO: 245 K/UL (ref 135–450)
PMV BLD AUTO: 8 FL (ref 5–10.5)
POTASSIUM SERPL-SCNC: 4 MMOL/L (ref 3.5–5.1)
RBC # BLD AUTO: 4.53 M/UL (ref 4–5.2)
SODIUM SERPL-SCNC: 141 MMOL/L (ref 136–145)
WBC # BLD AUTO: 7.9 K/UL (ref 4–11)

## 2024-01-07 PROCEDURE — 85025 COMPLETE CBC W/AUTO DIFF WBC: CPT

## 2024-01-07 PROCEDURE — 2580000003 HC RX 258: Performed by: INTERNAL MEDICINE

## 2024-01-07 PROCEDURE — 36415 COLL VENOUS BLD VENIPUNCTURE: CPT

## 2024-01-07 PROCEDURE — 6370000000 HC RX 637 (ALT 250 FOR IP): Performed by: INTERNAL MEDICINE

## 2024-01-07 PROCEDURE — 99233 SBSQ HOSP IP/OBS HIGH 50: CPT | Performed by: INTERNAL MEDICINE

## 2024-01-07 PROCEDURE — 80048 BASIC METABOLIC PNL TOTAL CA: CPT

## 2024-01-07 RX ORDER — LOSARTAN POTASSIUM 25 MG/1
25 TABLET ORAL DAILY
Qty: 30 TABLET | Refills: 3 | Status: SHIPPED | OUTPATIENT
Start: 2024-01-08

## 2024-01-07 RX ADMIN — ASPIRIN 81 MG: 81 TABLET, CHEWABLE ORAL at 08:08

## 2024-01-07 RX ADMIN — Medication 10 ML: at 08:09

## 2024-01-07 RX ADMIN — LOSARTAN POTASSIUM 25 MG: 25 TABLET, FILM COATED ORAL at 08:08

## 2024-01-07 RX ADMIN — METOPROLOL SUCCINATE 25 MG: 25 TABLET, EXTENDED RELEASE ORAL at 08:08

## 2024-01-07 RX ADMIN — Medication 10 ML: at 08:08

## 2024-01-07 ASSESSMENT — PAIN SCALES - GENERAL
PAINLEVEL_OUTOF10: 0

## 2024-01-07 NOTE — PROGRESS NOTES
[facility]   Cardiology Follow Up    Date: 2024  Reason for Consultation: Chest pain  Consult Requesting Physician: Raeann Vilchis MD     S:   - free of chest pain off nitro drip    Physical Examination:  /74   Pulse 79   Temp 98.3 °F (36.8 °C) (Oral)   Resp 16   Wt 90.4 kg (199 lb 4.7 oz)   SpO2 100%   BMI 30.30 kg/m²   Temp  Av.2 °F (36.8 °C)  Min: 97.8 °F (36.6 °C)  Max: 98.4 °F (36.9 °C)  Pulse  Av.6  Min: 70  Max: 87  BP  Min: 98/64  Max: 118/88  SpO2  Av %  Min: 96 %  Max: 100 %    Intake/Output Summary (Last 24 hours) at 2024 1133  Last data filed at 2024 0445  Gross per 24 hour   Intake 1763.27 ml   Output 1950 ml   Net -186.73 ml       No acute distress  Chest clear, nonlabored, no rhonchi or wheeze  Heart is regular rate, regular rhythm, no murmurs, no lower extremity swelling, no jugular venous distention, 2+ radial pulse right upper extremity, ecchymoses of forearm, no swelling   Abdomen is rounded, soft, nontender  Strength is grossly preserved, normal tone  No focal neurologic deficits  Appropriate mood and affect  No rashes    Labs:    Lab Results   Component Value Date/Time     2024 04:40 AM    K 4.0 2024 04:40 AM     2024 04:40 AM    CO2 25 2024 04:40 AM    BUN 12 2024 04:40 AM    CREATININE 0.7 2024 04:40 AM    GLUCOSE 109 2024 04:40 AM    CALCIUM 8.9 2024 04:40 AM      Lab Results   Component Value Date    TSHREFLEX 1.88 2020     Lab Results   Component Value Date/Time    ALKPHOS 85 2021 08:42 AM    ALT 20 2021 08:42 AM    AST 18 2021 08:42 AM    PROT 7.5 2021 08:42 AM    PROT 6.7 2011 02:44 PM    BILITOT 0.4 2021 08:42 AM    LABALBU 4.3 2021 08:42 AM        EKG 2024  Sinus  ST depression infer/lat MI    Stress MPI    Summary   Abnormal moderate risk myocardial perfusion study.   There is a moderate sized mainly fixed defect 
Discharge Note:    Patient was discharged to home per order. Removed CMU monitoring and two peripheral Ivs and stopped bleeding. Reviewed After Visit Summary with patient and her , including medications, follow up appointments and dressing changes. Patient and  have no further questions at this time. Patient left in good condition with all personal belongings. Patient left via wheelchair to main entrance with RN accompanying her. Patient picked up by  in car.    Teofilo Boyle RN  
Heparin gtt stopped upon arrival to cath lab.  
INPATIENT PULMONARY CRITICAL CARE PROGRESS NOTE      Reason for visit    LAD dissection        SUBJECTIVE: Patient when seen this morning was feeling better, patient did not have any chest pain, patient does not have any palpitations or diaphoresis, patient does not have any dizziness, patient was afebrile, patient was hemodynamically, patient still was still on nitroglycerin infusion but the rate has been decreased, patient was not on any supplemental oxygen and was maintaining saturation of 99% on room air, patient urine output has been adequate, patient has a cumulative fluid balance of -1.3 L, patient's glycemic control was acceptable, no other pertinent review of system of concern             Physical Exam:  Blood pressure 112/79, pulse 68, temperature 98.4 °F (36.9 °C), temperature source Oral, resp. rate 14, weight 90.4 kg (199 lb 4.7 oz), SpO2 100 %.'     Constitutional:  No acute distress.   HENT:  Oropharynx is clear and moist. No thyromegaly.  Eyes:  Conjunctivae are normal. Pupils equal, round, and reactive to light. No scleral icterus.   Neck: . No tracheal deviation present. No obvious thyroid mass.   Cardiovascular: Normal rate, regular rhythm, normal heart sounds.  No right ventricular heave. No lower extremity edema.  Pulmonary/Chest: No wheezes.  No rales.  Chest wall is not dull to percussion.  No accessory muscle usage or stridor.   Abdominal: Soft. Bowel sounds present. No distension or hernia. No tenderness.    Musculoskeletal: No cyanosis. No clubbing. No obvious joint deformity.   Lymphadenopathy: No cervical or supraclavicular adenopathy.   Skin: Skin is warm and dry. No rash or nodules on the exposed extremities.  Psychiatric: Normal mood and affect. Behavior is normal.  No anxiety.   Neurologic: Alert, awake and oriented. PERRL.  Speech fluent;[place]    Results:  CBC:   Recent Labs     01/05/24  0712 01/05/24  0935 01/06/24  0431   WBC 7.1 8.2 8.7   HGB 14.2 14.7 13.9   HCT 41.1 43.0 40.1 
Labels and Orders are correct and during bedside report all IV tubing has been physically traced from labeled bag, to channel, to IV site and verified by oncoming and off going nurse.       Report given to [place]  Cath Lab RNElectronically signed by Gabriela Rogel RN on 1/5/24 at 12:02 PM EST    Accepting RNElectronically signed by Soila Hatch RN on 1/5/24 at 12:04 PM EST   
Per pt chart, pt never a smoker. No smoking cessation education given.  
Pt saline locked, PCU level of care.    Pt with tele box, spot check sp02.  Sinus rythym.      Pt ambulatory, up ad collette.  Declines shower here this am, wants to shower at home with her own toiletries.  Did provide CHG wipe and educate.     Pt VSS.  Chest pain or any other pain denied through duration of shift.   Pt tolerating po, snacking ad collette, voiding and had BM this am.    Stable.     Pt radial puncture site is bruised and a soft hematoma is noted from the lower forearm extending down in the thumb area, palmar side of hand.   Bruising noted from code blue pads.  Bruising from BP cuff.  Otherwise skin intact.      Denies lightness of head, dizzy, other c/o.      PT lungs cta, room air, WDL respiratory.     Education on condition, 30 min total.     No lab dyscrasia noted this am.  K+ not within level of needing replacement at 4.0    Gavi Khan RN   
[facility]   Cardiology Follow Up    Date: 2024  Reason for Consultation: Chest pain  Consult Requesting Physician: Raeann Vilchis MD     S:   - Patient has had no chest pain, no complaints  - Nursing had marked site of ecchymoses on the right forearm, not expanding    Physical Examination:  /88   Pulse 77   Temp 98.4 °F (36.9 °C) (Oral)   Resp 16   Wt 90.4 kg (199 lb 4.7 oz)   SpO2 100%   BMI 30.30 kg/m²   Temp  Av.3 °F (36.8 °C)  Min: 97.9 °F (36.6 °C)  Max: 98.4 °F (36.9 °C)  Pulse  Av.6  Min: 64  Max: 126  BP  Min: 105/73  Max: 125/82  SpO2  Av.6 %  Min: 98 %  Max: 100 %    Intake/Output Summary (Last 24 hours) at 2024 1412  Last data filed at 2024 0659  Gross per 24 hour   Intake 221.05 ml   Output 1600 ml   Net -1378.95 ml     No acute distress  Chest clear, nonlabored, no rhonchi or wheeze  Heart is regular rate, regular rhythm, no murmurs, no lower extremity swelling, no jugular venous distention, 2+ radial pulse right upper extremity, ecchymoses of forearm  Abdomen is rounded, soft, nontender  Strength is grossly preserved, normal tone  No focal neurologic deficits  Appropriate mood and affect  No rashes      Labs:    Lab Results   Component Value Date/Time     2024 04:31 AM    K 4.3 2024 04:31 AM     2024 04:31 AM    CO2 24 2024 04:31 AM    BUN 10 2024 04:31 AM    CREATININE 0.6 2024 04:31 AM    GLUCOSE 96 2024 04:31 AM    CALCIUM 8.7 2024 04:31 AM      Lab Results   Component Value Date    TSHREFLEX 1.88 2020     Lab Results   Component Value Date/Time    ALKPHOS 85 2021 08:42 AM    ALT 20 2021 08:42 AM    AST 18 2021 08:42 AM    PROT 7.5 2021 08:42 AM    PROT 6.7 2011 02:44 PM    BILITOT 0.4 2021 08:42 AM    LABALBU 4.3 2021 08:42 AM        EKG 2024  Sinus  ST depression infer/lat MI    Stress MPI    Summary   Abnormal moderate risk 
24 hour   Intake 221.05 ml   Output 1600 ml   Net -1378.95 ml        Vitals:   Vitals:    01/06/24 1302   BP: 118/88   Pulse: 77   Resp: 16   Temp:    SpO2: 100%       Physical Exam:     General: NAD  Eyes: EOMI  ENT: neck supple  Cardiovascular: Regular rate.  Respiratory: Clear to auscultation  Gastrointestinal: Soft, non tender  Genitourinary: no suprapubic tenderness  Musculoskeletal: No edema  Skin: warm, dry  Neuro: Alert.  Psych: Mood appropriate.     Medications:   Medications:    aspirin  81 mg Oral Daily    atorvastatin  40 mg Oral Nightly    lisinopril  2.5 mg Oral Daily    metoprolol succinate  25 mg Oral Daily    sodium chloride flush  5-40 mL IntraVENous 2 times per day    sodium chloride flush  5-40 mL IntraVENous 2 times per day      Infusions:    sodium chloride      nitroGLYCERIN 5 mcg/min (01/06/24 0659)    sodium chloride       PRN Meds: sodium chloride flush, 5-40 mL, PRN  sodium chloride, , PRN  hydrALAZINE, 10 mg, Q10 Min PRN  morphine, 2 mg, Q4H PRN  sodium chloride flush, 5-40 mL, PRN  sodium chloride, , PRN  potassium chloride, 40 mEq, PRN   Or  potassium alternative oral replacement, 40 mEq, PRN   Or  potassium chloride, 10 mEq, PRN  magnesium sulfate, 2,000 mg, PRN  ondansetron, 4 mg, Q8H PRN   Or  ondansetron, 4 mg, Q6H PRN  polyethylene glycol, 17 g, Daily PRN  acetaminophen, 650 mg, Q6H PRN   Or  acetaminophen, 650 mg, Q6H PRN        Labs      Recent Results (from the past 24 hour(s))   Basic Metabolic Panel w/ Reflex to MG    Collection Time: 01/06/24  4:31 AM   Result Value Ref Range    Sodium 138 136 - 145 mmol/L    Potassium reflex Magnesium 4.3 3.5 - 5.1 mmol/L    Chloride 105 99 - 110 mmol/L    CO2 24 21 - 32 mmol/L    Anion Gap 9 3 - 16    Glucose 96 70 - 99 mg/dL    BUN 10 7 - 20 mg/dL    Creatinine 0.6 0.6 - 1.1 mg/dL    Est, Glom Filt Rate >60 >60    Calcium 8.7 8.3 - 10.6 mg/dL   CBC with Auto Differential    Collection Time: 01/06/24  4:31 AM   Result Value Ref Range    WBC

## 2024-01-07 NOTE — PLAN OF CARE
Problem: Discharge Planning  Goal: Discharge to home or other facility with appropriate resources  1/7/2024 0800 by Teofilo Boyle, RN  Outcome: Progressing     Problem: Pain  Goal: Verbalizes/displays adequate comfort level or baseline comfort level  1/7/2024 0800 by Teofilo Boyle RN  Outcome: Progressing  Verbalizes/displays adequate comfort level or baseline comfort level:   Encourage patient to monitor pain and request assistance   Assess pain using appropriate pain scale   Administer analgesics based on type and severity of pain and evaluate response   Implement non-pharmacological measures as appropriate and evaluate response   Consider cultural and social influences on pain and pain management     Teofilo Boyle, RN

## 2024-01-07 NOTE — PLAN OF CARE
Problem: Discharge Planning  Goal: Discharge to home or other facility with appropriate resources  Outcome: Progressing     Problem: Pain  Goal: Verbalizes/displays adequate comfort level or baseline comfort level  Outcome: Progressing  Flowsheets (Taken 1/6/2024 2000)  Verbalizes/displays adequate comfort level or baseline comfort level:   Encourage patient to monitor pain and request assistance   Assess pain using appropriate pain scale   Administer analgesics based on type and severity of pain and evaluate response   Implement non-pharmacological measures as appropriate and evaluate response   Consider cultural and social influences on pain and pain management

## 2024-01-07 NOTE — DISCHARGE INSTR - COC
Continuity of Care Form    Patient Name: Norma Dave   :  1980  MRN:  [id number]    Admit date:  2024  Discharge date:  ***    Code Status Order: Full Code   Advance Directives:     Admitting Physician:  Kaley Grissom MD  PCP: Pedrito Hansen DO    Discharging Nurse: ***  Discharging Hospital Unit/Room#: 0231/0231-01  Discharging Unit Phone Number: ***    Emergency Contact:   Extended Emergency Contact Information  Primary Emergency Contact: Adam Dave  Address: [address] [place] [address]  Home Phone: [phone]  Relation: Spouse    Past Surgical History:  Past Surgical History:   Procedure Laterality Date    CARDIAC CATHETERIZATION  2019    KNEE SURGERY         Immunization History:     There is no immunization history on file for this patient.    Active Problems:  Patient Active Problem List   Diagnosis Code    Acute ST elevation myocardial infarction (STEMI) ([facility]) I21.3    Coronary artery dissection I25.42    Hypomagnesemia E83.42    Bilateral hearing loss H91.93    Ischemic cardiomyopathy I25.5    NSVT (nonsustained ventricular tachycardia) ([facility]) I47.29    Sensorineural hearing loss, bilateral H90.3    Palpitations R00.2    Other chest pain R07.89    Edema of both legs R60.0    Status post cardiac catheterization Z98.890    Chest pain with high risk of acute coronary syndrome R07.9    Dissection of coronary artery I25.42       Isolation/Infection:   Isolation            No Isolation          Patient Infection Status       None to display            Nurse Assessment:  Last Vital Signs: BP 91/60   Pulse 73   Temp 98.3 °F (36.8 °C) (Oral)   Resp 18   Wt 90.4 kg (199 lb 4.7 oz)   SpO2 99%   BMI 30.30 kg/m²     Last documented pain score (0-10 scale): Pain Level: 0  Last Weight:   Wt Readings from Last 1 Encounters:   24 90.4 kg (199 lb 4.7 oz)     Mental Status:  {IP PT MENTAL STATUS:26852}    IV Access:  { RUMA IV

## 2024-01-07 NOTE — DISCHARGE SUMMARY
Discharge Summary    Name:  Norma Dave /Age/Sex: 1980  (43 y.o. 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  Respiratory: Clear to auscultation  Gastrointestinal: Soft, non tender  Genitourinary: no suprapubic tenderness  Musculoskeletal: No edema  Skin: warm, dry  Neuro: Alert.  Psych: Mood appropriate.     Hospital Course:   Norma Dave is a 43 y.o.  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  -Initially managed on nitroglycerin and Integrilin drip.  Currently chest pain-free.  -Cardiology recommending whole-body arterial imaging to assess for FMD nonurgently  -Plan to repeat echocardiogram in 2 to 3 months  -Follow-up with cardiology outpatient        HFrEF  Ischemic cardiomyopathy  Found to have reduced LV function and anterior hypokinesis and ejection fraction of 40% during LHC  Continue GDMT as tolerated  Echocardiogram completed.  30 to 35%       Bilateral sensorineural hearing loss    The patient expressed appropriate understanding of and agreement with the discharge recommendations,

## 2024-01-08 ENCOUNTER — TELEPHONE (OUTPATIENT)
Dept: CARDIOLOGY CLINIC | Age: 44
End: 2024-01-08

## 2024-01-08 NOTE — TELEPHONE ENCOUNTER
RNDP can we use the 145pm on 1/11/24 at [place] with vsp? Not sure if you are holding this for another pt?

## 2024-01-08 NOTE — TELEPHONE ENCOUNTER
This is a pt of [facility] and was already scheduled with him for an annual followup for 1/10/24

## 2024-01-08 NOTE — TELEPHONE ENCOUNTER
----- Message from Nicolas Petersen MD sent at 1/7/2024 11:50 AM EST -----  Patient in hospital as follow-up of the weekend for spontaneous coronary artery dissection, initially treated by Dr Grissom, discussed with him over the weekend regarding additional day of inpatient stay versus follow-up, patient really wanted to go home, asymptomatic, and wew will be discharged on GDMT for cardiomyopathy, Dr Grissom thought that he might have availability on Tuesday or Wednesday of this week for follow-up, can this please be arranged, patient is available on particular case.    Thanks    Nicola

## 2024-01-08 NOTE — TELEPHONE ENCOUNTER
Per pt spouse pt has not yet seen [facility] and while in hospital over weekend VSP said he was going to take over her care. Pt's souse calling make F/U appt. Per CMV message VSP thought he had openings this week which is not the case. Does VSP want pt added on, or to keep 1/10/24 appt with [facility]. Pt spouse has questions as to when pt can returned to work, along with when does VSP want Echo done. Pt spouse was given note for his employer for missing work and to returned 1/9/24. Should he be off longer? Please advise.

## 2024-01-08 NOTE — TELEPHONE ENCOUNTER
Called and spoke with patient okay to speak with Adam her spouse per [facility]. Appt made VU to time,date, and location of appt.

## 2024-01-10 DIAGNOSIS — I21.3 ACUTE ST ELEVATION MYOCARDIAL INFARCTION (STEMI), UNSPECIFIED ARTERY (HCC): ICD-10-CM

## 2024-01-10 DIAGNOSIS — I25.42 CORONARY ARTERY DISSECTION: Primary | ICD-10-CM

## 2024-01-10 RX ORDER — ASPIRIN 81 MG/1
TABLET, CHEWABLE ORAL
Qty: 90 TABLET | Refills: 3 | Status: SHIPPED | OUTPATIENT
Start: 2024-01-10

## 2024-01-11 ENCOUNTER — HOSPITAL ENCOUNTER (OUTPATIENT)
Dept: VASCULAR LAB | Age: 44
Discharge: HOME | End: 2024-01-11
Attending: INTERNAL MEDICINE
Payer: COMMERCIAL

## 2024-01-11 ENCOUNTER — OFFICE VISIT (OUTPATIENT)
Dept: CARDIOLOGY CLINIC | Age: 44
End: 2024-01-11
Payer: COMMERCIAL

## 2024-01-11 VITALS
SYSTOLIC BLOOD PRESSURE: 126 MMHG | HEART RATE: 75 BPM | OXYGEN SATURATION: 97 % | BODY MASS INDEX: 30.23 KG/M2 | WEIGHT: 199.5 LBS | HEIGHT: 68 IN | DIASTOLIC BLOOD PRESSURE: 74 MMHG

## 2024-01-11 DIAGNOSIS — I25.42 CORONARY ARTERY DISSECTION: ICD-10-CM

## 2024-01-11 DIAGNOSIS — I25.5 ISCHEMIC CARDIOMYOPATHY: ICD-10-CM

## 2024-01-11 DIAGNOSIS — R60.9 SWELLING: ICD-10-CM

## 2024-01-11 DIAGNOSIS — I21.3 ACUTE ST ELEVATION MYOCARDIAL INFARCTION (STEMI), UNSPECIFIED ARTERY (HCC): Primary | ICD-10-CM

## 2024-01-11 PROCEDURE — 99215 OFFICE O/P EST HI 40 MIN: CPT | Performed by: INTERNAL MEDICINE

## 2024-01-11 PROCEDURE — 93931 UPPER EXTREMITY STUDY: CPT

## 2024-01-11 NOTE — PATIENT INSTRUCTIONS
Your provider has ordered testing for further evaluation.  An order/prescription has been included in your paper work.   To schedule outpatient testing, contact Central Scheduling by calling [employer] ([phone]).     Plan:  1. Order STAT vascular right arm arterial doppler ~  assess post catheterization site  2. I recommend that the patient continue their currently prescribed medications. Their drug modifiable risk factors appear to be well controlled. I will continue to address the need/dosing of medications in future visits.   3. Patient given detailed instructions on addressing diet, regular exercise, weight control, smoking abstention, medication compliance, and stress minimization. The patient was provided written and verbal instructions regarding risk factor modification.    4. Recommend cardiac rehab ~ history of STEMI, coronary artery dissection  5. Recommend fiber capsules, psyllium husk supplement, (Metamucil) and increasing water intake ~ constipation   6. Order echocardiogram in 8 weeks ~ ischemic cardiomyopathy (around March 4, 2024)   7. Follow up in February 2024

## 2024-01-11 NOTE — PROGRESS NOTES
/    [facility]   Cardiovascular Evaluation    PATIENT: Norma Dave  DATE: 2024  MRN: [id number]  CSN: [id number]  : 1980    Primary Care Doctor/Referring provider: Pedrito Hansen DO, No admitting provider for patient encounter.     Reason for evaluation/Chief complaint:   Follow-up, Coronary Artery Disease, Cardiomyopathy, and Fatigue      Subjective:    History of present illness on initial date of evaluation:   Norma Dave is a 43 y.o. female patient who presents for cardiology hospital follow up. She has a medical history significant of family history of early CAD, congenital deafness, STEMI, NSVT,  s/p spontaneous coronary artery dissection, and ischemic cardiomyopathy. She was admitted to [facility] 19-19 presenting with complaints of chest pain. Troponin elevated. EKG STEMI, ST elevation noted. She therefore underwent emergent LHC with  2019 revealing spontaneous coronary artery dissection of mid to apical LAD. Transferred to ICU. Echo 19 EF 35-40%, severe hypokinesias of anterior apical walls consistent with infarction of LAD, Grade I DD, mild MR. She had followed with Dr.Jason Guzman for subsequent follow up.    She presented to [facility] and was admitted 24-24 arriving with complaints of chest pain. She was airlifted for emergent left heart cath in relation to STEMI. She underwent left heart catheterization 24 that revealed spontaneous coronary dissection involving LAD and diagonal, she was admitted to ICU with antiplatelet and anticoagulation therapy. Echo 24 EF 30-35%, hypokinesis of apex, apical lateral, apical septum anterior and inferolateral walls, and mild MR.    Today she presents with her . She has not returned to work. She states has been feeling fatigued. She has pain in her wrist with certain movements. She reports after leaving hospital diastolic pressure in

## 2024-01-12 ENCOUNTER — TELEPHONE (OUTPATIENT)
Dept: CARDIOLOGY CLINIC | Age: 44
End: 2024-01-12

## 2024-01-12 NOTE — TELEPHONE ENCOUNTER
----- Message from Kaley Grissom MD sent at 1/11/2024  5:09 PM EST -----  The test is normal.   I called the /patient and inform them of the normal result.

## 2024-01-18 ENCOUNTER — TELEPHONE (OUTPATIENT)
Dept: CARDIOLOGY CLINIC | Age: 44
End: 2024-01-18

## 2024-01-18 NOTE — TELEPHONE ENCOUNTER
Pt's spouse sts while in office for appt, they gave staff FMLA paper work to fax. Before they checked out staff said was filled out and faxed. Pt sts [employer] did not get fax. Pt's souse wants forms emailed to [email or web address]. Wants this done in next 10-15 minutes. Pt's spouse became nasty with me stating he wanted to speak to medical staff now, he sts 5 are always sitting around doing nothing when they come in. Transferred call to Cris. Please advise.

## 2024-01-24 ENCOUNTER — TELEPHONE (OUTPATIENT)
Dept: CARDIOLOGY CLINIC | Age: 44
End: 2024-01-24

## 2024-01-24 NOTE — TELEPHONE ENCOUNTER
VSP please advise if pt an return to light duty work, no more then 20 hours per week starting 2/5/24

## 2024-01-24 NOTE — TELEPHONE ENCOUNTER
PT DAILY TREATMENT NOTE     Patient Name: Jayme Buenrostro  Date:2021  : 1973  [x]  Patient  Verified  Payor: CLAIR / Plan: Zohaib Turner 74 / Product Type: Macel Radha /    In time:11:30  Out time:12:15  Total Treatment Time (min): 45  Visit #: 1 of 8    Treatment Area: Pain in right hip [M25.551]    SUBJECTIVE  Pain Level (0-10 scale): 0/10  Any medication changes, allergies to medications, adverse drug reactions, diagnosis change, or new procedure performed?: [x] No    [] Yes (see summary sheet for update)  Subjective functional status/changes:   [] No changes reported  The patient reports that no new complaints upon arrival.     OBJECTIVE  30 min Therapeutic Exercise:  [x] See flow sheet :   Rationale: increase ROM and increase strength to improve the patients ability to improve ADL ease. 15 min Neuromuscular Re-education:  []  See flow sheet :   Rationale: improve coordination, improve balance and increase proprioception  to improve the patients ability to improve ADL ease. With   [] TE   [] TA   [] neuro   [] other: Patient Education: [x] Review HEP    [] Progressed/Changed HEP based on:   [] positioning   [] body mechanics   [] transfers   [] heat/ice application    [] other:      Other Objective/Functional Measures:   Progressed step up height noting weakness and difficulty through range today. Verbal cues to limit lateral trunk shift compensation with patient verbalizing understanding. Hip ABD strength: 3/5 MMT right hip    Pain Level (0-10 scale) post treatment: 0/10    ASSESSMENT/Changes in Function: Progressing with hip strength and stability. Notable compensations remain, but session challenged patient to progress towards stability and continued hip strengthening. Notable improvement in right hip strength, she can lift fully against gravity at 3/5 MMT.      Patient will continue to benefit from skilled PT services to modify and progress therapeutic Pts  stated that pt is due to go back to work on 02/5/24. They would like to know if she is able to get a letter for work that states that she is to be on light duty. Pt works at [employer] and is on feet for whole shift, she unloads and lifts boxes and displays. Pt would also like to know if she can ease back into work with going back at 20 hours for the first two weeks. If we are able to provide letter they would like it emailed to [email or web address]. pts  stated that [facility] is making the schedule this week and need to know sooner than later regarding letter for work. Please advise.   interventions, address functional mobility deficits, address ROM deficits, address strength deficits, analyze and address soft tissue restrictions, analyze and cue movement patterns, analyze and modify body mechanics/ergonomics, assess and modify postural abnormalities and instruct in home and community integration to attain remaining goals. []  See Plan of Care  []  See progress note/recertification  []  See Discharge Summary         Progress towards goals / Updated goals:  Updated Goals: to be achieved in 4 weeks:              1. Improve right glute med strength to 4/5 to improve gait              PN: Progressing - 3-/5 MMT 4/12/2021   Current: 3/5 MMT hip ABD 4/14/2021              2. The patient will demonstrate right LE single leg stance for 30\" to improve stability in stance phase of gait. PN: unable. 3. The patient will demonstrate mild trendelenburg during ambulation to improve ease and efficiency of gait.               PN: marked trendelenburg    PLAN  []  Upgrade activities as tolerated     [x]  Continue plan of care  []  Update interventions per flow sheet       []  Discharge due to:_  []  Other:_      Case Gonzales, PT 4/14/2021  11:42 AM    Future Appointments   Date Time Provider Kingsley Stout   4/16/2021 10:40 AM Jamal Gomez PA-C [facility]   4/16/2021  1:45 PM Naresh Andrea PTA [facility]   4/19/2021 12:00 PM Balinda Rounds, PT [facility]   4/21/2021 11:30 AM Balinda Rounds, PT [facility]   4/23/2021 11:30 AM Balinda Rounds, PT [facility]   5/14/2021  1:30 PM Pauline Jay MD [facility] AMB   5/19/2021  9:30 AM MD ARNAUD Ramirez [facility] AMB   6/15/2021  4:30 PM [facility] CHRISTOPHER RM 3 3D [facility]

## 2024-01-25 NOTE — TELEPHONE ENCOUNTER
Letter created per [facility]. Emailed to pt, called to speak with pt, pt  answered on HIPAA form. Relayed, he v/u

## 2024-01-30 NOTE — TELEPHONE ENCOUNTER
Pt  called, disability ppw stated she could return full time on 2/5 not part time. Printed ppw, updated ppw to part time on 2/5, refaxed and emailed to pt. Will scan into media.

## 2024-01-31 NOTE — TELEPHONE ENCOUNTER
Pt's spouse sts they did not receive E-mail. Double checked E-mail address [email or web address] please send again. TY

## 2024-02-07 DIAGNOSIS — E78.5 HYPERLIPIDEMIA, UNSPECIFIED HYPERLIPIDEMIA TYPE: ICD-10-CM

## 2024-02-07 RX ORDER — ATORVASTATIN CALCIUM 40 MG/1
TABLET, FILM COATED ORAL
Qty: 90 TABLET | Refills: 3 | Status: SHIPPED | OUTPATIENT
Start: 2024-02-07 | End: 2024-02-08

## 2024-02-07 NOTE — TELEPHONE ENCOUNTER
Last ov: 1/11/24 [facility]  Upcoming ov: 2/8/24 [facility]    Lipid- 6/24/21    [facility]- would you like me to order a lipid panel for pt

## 2024-02-08 ENCOUNTER — OFFICE VISIT (OUTPATIENT)
Dept: CARDIOLOGY CLINIC | Age: 44
End: 2024-02-08
Payer: COMMERCIAL

## 2024-02-08 VITALS
SYSTOLIC BLOOD PRESSURE: 118 MMHG | HEIGHT: 68 IN | BODY MASS INDEX: 31.22 KG/M2 | DIASTOLIC BLOOD PRESSURE: 64 MMHG | OXYGEN SATURATION: 97 % | WEIGHT: 206 LBS | HEART RATE: 81 BPM

## 2024-02-08 DIAGNOSIS — I50.22 SYSTOLIC CHF, CHRONIC (HCC): ICD-10-CM

## 2024-02-08 DIAGNOSIS — I25.42 CORONARY ARTERY DISSECTION: ICD-10-CM

## 2024-02-08 DIAGNOSIS — Z79.899 MEDICATION MANAGEMENT: Primary | ICD-10-CM

## 2024-02-08 DIAGNOSIS — I25.5 ISCHEMIC CARDIOMYOPATHY: ICD-10-CM

## 2024-02-08 PROCEDURE — 99214 OFFICE O/P EST MOD 30 MIN: CPT | Performed by: INTERNAL MEDICINE

## 2024-02-08 NOTE — PATIENT INSTRUCTIONS
Plan:  1. Discontinue Losartan Potassium (COZAAR)  2. WAIT 36 hours then start taking ENTRESTO 24-26 mg tablet twice a day  3. BMP in 10-14 days from starting Entresto  4. Discussed jardiance and aldactone therapy  5. Follow with Gertrude Morin CNP in 1 month

## 2024-02-08 NOTE — PROGRESS NOTES
/    [facility]   Cardiovascular Evaluation    PATIENT: Norma Dave  DATE: 2024  MRN: [id number]  CSN: [id number]  : 1980    Primary Care Doctor/Referring provider: Pedrito Hansen DO, No admitting provider for patient encounter.     Reason for evaluation/Chief complaint:   Follow-up and Cardiomyopathy      Subjective:    History of present illness on initial date of evaluation:   Norma Dave is a 43 y.o. female patient who presents for cardiology hospital follow up. She has a medical history significant of family history of early CAD, congenital deafness, STEMI, NSVT,  s/p spontaneous coronary artery dissection, and ischemic cardiomyopathy. She was admitted to [facility] 19-19 presenting with complaints of chest pain. Troponin elevated. EKG STEMI, ST elevation noted. She therefore underwent emergent LHC with  2019 revealing spontaneous coronary artery dissection of mid to apical LAD. Transferred to ICU. Echo 19 EF 35-40%, severe hypokinesias of anterior apical walls consistent with infarction of LAD, Grade I DD, mild MR. She had followed with Dr.Jason Guzman for subsequent follow up.    She presented to [facility] and was admitted 24-24 arriving with complaints of chest pain. She was airlifted for emergent left heart cath in relation to STEMI. She underwent left heart catheterization 24 that revealed spontaneous coronary dissection involving LAD and diagonal, she was admitted to ICU with antiplatelet and anticoagulation therapy. Echo 24 EF 30-35%, hypokinesis of apex, apical lateral, apical septum anterior and inferolateral walls, and mild MR.    Today she is doing well. Patient currently denies any weight gain, edema, palpitations, chest pain, shortness of breath, dizziness, and syncope. She is compliant with medication as prescribed.     Patient Active Problem List   Diagnosis    Acute ST

## 2024-03-07 ENCOUNTER — HOSPITAL ENCOUNTER (OUTPATIENT)
Dept: CARDIOLOGY | Age: 44
Discharge: HOME | End: 2024-03-07
Attending: INTERNAL MEDICINE
Payer: COMMERCIAL

## 2024-03-07 DIAGNOSIS — I21.3 ACUTE ST ELEVATION MYOCARDIAL INFARCTION (STEMI), UNSPECIFIED ARTERY (HCC): ICD-10-CM

## 2024-03-07 DIAGNOSIS — I25.42 CORONARY ARTERY DISSECTION: ICD-10-CM

## 2024-03-07 DIAGNOSIS — I25.5 ISCHEMIC CARDIOMYOPATHY: ICD-10-CM

## 2024-03-07 PROCEDURE — 93306 TTE W/DOPPLER COMPLETE: CPT

## 2024-03-08 ENCOUNTER — TELEPHONE (OUTPATIENT)
Dept: CARDIOLOGY CLINIC | Age: 44
End: 2024-03-08

## 2024-03-08 NOTE — TELEPHONE ENCOUNTER
----- Message from Kaley Grissom MD sent at 3/7/2024  4:37 PM EST -----  Please let the patient know that we are seeing improvement in their test results.    There are has returned back to normal    Please have them see us as planned or sooner if clinical concerns.

## 2024-03-13 ENCOUNTER — OFFICE VISIT (OUTPATIENT)
Dept: CARDIOLOGY CLINIC | Age: 44
End: 2024-03-13
Payer: COMMERCIAL

## 2024-03-13 VITALS
OXYGEN SATURATION: 100 % | SYSTOLIC BLOOD PRESSURE: 110 MMHG | HEIGHT: 68 IN | DIASTOLIC BLOOD PRESSURE: 76 MMHG | WEIGHT: 208.5 LBS | HEART RATE: 62 BPM | BODY MASS INDEX: 31.6 KG/M2

## 2024-03-13 DIAGNOSIS — I25.5 ISCHEMIC CARDIOMYOPATHY: Primary | ICD-10-CM

## 2024-03-13 DIAGNOSIS — I25.42 CORONARY ARTERY DISSECTION: ICD-10-CM

## 2024-03-13 DIAGNOSIS — I50.22 SYSTOLIC CHF, CHRONIC (HCC): ICD-10-CM

## 2024-03-13 PROCEDURE — 99214 OFFICE O/P EST MOD 30 MIN: CPT | Performed by: NURSE PRACTITIONER

## 2024-03-13 RX ORDER — LOSARTAN POTASSIUM 25 MG/1
25 TABLET ORAL DAILY
COMMUNITY
End: 2024-03-13 | Stop reason: ALTCHOICE

## 2024-03-13 NOTE — PATIENT INSTRUCTIONS
Plan:   Stop the losartan and change to Entresto 24/26mg take 1 tab twice a day  Check labs in 1-2 weeks, BMP and Vit D level  Recommend CTA head/neck chest/abd/pelvis given spontaneous artery dissection  Call to schedule the CT scan [phone]   Follow up 3 months

## 2024-03-13 NOTE — PROGRESS NOTES
[facility]  Heart Failure  Clinic    Primary Care Doctor:  Pedrito Hansen DO    Chief Complaint   Patient presents with    New Patient    Congestive Heart Failure        History of Present Illness:  I had the pleasure of seeing Norma Dave as a new patient to me for cardiomyopathy.   Hx of STEMI, s/p Coronary artery dissection 11/2019, ischemic cardiomyopathy with LVEF 35-40%.   Admitted 1/5/2024 with STEMI/chest pain, LHC showed SCAD involving LAD and diag; echo with LVEF 30-35%. Reports the chest pain that she had this time was different then last dissection.   Echo shows improved 50-55%.   She is having a lot of fatigue.   She is having some indigestion in the chest - complains of constipation for some time.   No shortness of breath.   No dizziness/lightheadedness  Having headaches - behind her eyes/pressures in the face.   She had cluster HA in 12/2023- improved 4 hours later.     Norma Dave describes symptoms including fatigue but denies chest pain, dyspnea, palpitations, orthopnea, early saiety, edema, syncope.       Past Medical History:   has a past medical history of Allergic rhinitis, GERD (gastroesophageal reflux disease), Headache, Hearing disorder, Ischemic cardiomyopathy, STEMI (ST elevation myocardial infarction) (HCC), and Tinnitus.  Surgical History:   has a past surgical history that includes knee surgery and Cardiac catheterization (11/26/2019).   Social History:   reports that she has never smoked. She has never used smokeless tobacco. She reports current alcohol use. She reports that she does not use drugs.   Family History:   Family History   Problem Relation Age of Onset    Heart Attack Mother     Cancer Father         skin    Cancer Maternal Grandmother         skin     Home Medications:  Prior to Admission medications    Medication Sig Start Date End Date Taking? Authorizing Provider   sacubitril-valsartan (ENTRESTO) 24-26 MG per tablet Take 1 tablet by mouth 2 times

## 2024-03-26 ENCOUNTER — HOSPITAL ENCOUNTER (OUTPATIENT)
Age: 44
Discharge: HOME | End: 2024-03-26
Payer: COMMERCIAL

## 2024-03-26 DIAGNOSIS — I25.42 CORONARY ARTERY DISSECTION: ICD-10-CM

## 2024-03-26 DIAGNOSIS — I50.22 SYSTOLIC CHF, CHRONIC (HCC): ICD-10-CM

## 2024-03-26 DIAGNOSIS — I25.5 ISCHEMIC CARDIOMYOPATHY: ICD-10-CM

## 2024-03-26 LAB
25(OH)D3 SERPL-MCNC: 23.5 NG/ML
ANION GAP SERPL CALCULATED.3IONS-SCNC: 10 MMOL/L (ref 3–16)
BUN SERPL-MCNC: 19 MG/DL (ref 7–20)
CALCIUM SERPL-MCNC: 9 MG/DL (ref 8.3–10.6)
CHLORIDE SERPL-SCNC: 107 MMOL/L (ref 99–110)
CHOLEST SERPL-MCNC: 113 MG/DL (ref 0–199)
CO2 SERPL-SCNC: 25 MMOL/L (ref 21–32)
CREAT SERPL-MCNC: 0.8 MG/DL (ref 0.6–1.1)
GFR SERPLBLD CREATININE-BSD FMLA CKD-EPI: >90 ML/MIN/{1.73_M2}
GLUCOSE SERPL-MCNC: 91 MG/DL (ref 70–99)
HDLC SERPL-MCNC: 47 MG/DL (ref 40–60)
LDL CHOLESTEROL CALCULATED: 55 MG/DL
POTASSIUM SERPL-SCNC: 4 MMOL/L (ref 3.5–5.1)
SODIUM SERPL-SCNC: 142 MMOL/L (ref 136–145)
TRIGL SERPL-MCNC: 55 MG/DL (ref 0–150)
VLDLC SERPL CALC-MCNC: 11 MG/DL

## 2024-03-26 PROCEDURE — 82306 VITAMIN D 25 HYDROXY: CPT

## 2024-03-26 PROCEDURE — 36415 COLL VENOUS BLD VENIPUNCTURE: CPT

## 2024-03-26 PROCEDURE — 80061 LIPID PANEL: CPT

## 2024-03-26 PROCEDURE — 80048 BASIC METABOLIC PNL TOTAL CA: CPT

## 2024-03-28 RX ORDER — ERGOCALCIFEROL 1.25 MG/1
50000 CAPSULE ORAL WEEKLY
Qty: 4 CAPSULE | Refills: 0 | Status: SHIPPED | OUTPATIENT
Start: 2024-03-28

## 2024-03-28 RX ORDER — MULTIVIT-MIN/IRON/FOLIC ACID/K 18-600-40
1 CAPSULE ORAL DAILY
COMMUNITY

## 2024-03-28 NOTE — TELEPHONE ENCOUNTER
----- Message from MIKE Arce - CNP sent at 3/28/2024  7:34 AM EDT -----  Please notify patient results. Kidney function is stable. Electrolytes are stable with starting the entresto.   Vit D level is low- start vit D3 50,000 units once a week for 4 weeks and then go to 2000 IU daily.

## 2024-04-22 RX ORDER — ERGOCALCIFEROL 1.25 MG/1
50000 CAPSULE ORAL WEEKLY
Qty: 12 CAPSULE | Refills: 1 | OUTPATIENT
Start: 2024-04-22

## 2024-04-22 NOTE — TELEPHONE ENCOUNTER
12 week supply is not appropriate since I am recommending only a 4 week course of this dose.   Thanks

## 2024-04-22 NOTE — TELEPHONE ENCOUNTER
3/13/2024 NPRB  7/2/2024 NPRB upcoming appt  3/26/2024 bmp      Per labs 3/26/2024 -Vit D level is low- start vit D3 50,000 units once a week for 4 weeks and then go to 2000 IU daily.

## 2024-07-23 DIAGNOSIS — I25.42 CORONARY ARTERY DISSECTION: ICD-10-CM

## 2024-07-23 DIAGNOSIS — I25.5 ISCHEMIC CARDIOMYOPATHY: ICD-10-CM

## 2024-07-24 RX ORDER — SACUBITRIL AND VALSARTAN 24; 26 MG/1; MG/1
1 TABLET, FILM COATED ORAL 2 TIMES DAILY
Qty: 60 TABLET | Refills: 3 | Status: SHIPPED | OUTPATIENT
Start: 2024-07-24

## 2024-07-31 DIAGNOSIS — I50.22 SYSTOLIC CHF, CHRONIC (HCC): ICD-10-CM

## 2024-07-31 RX ORDER — METOPROLOL SUCCINATE 25 MG/1
TABLET, EXTENDED RELEASE ORAL
Qty: 90 TABLET | Refills: 3 | Status: SHIPPED | OUTPATIENT
Start: 2024-07-31

## 2024-08-13 NOTE — PROGRESS NOTES
Pulses: 2+ and equal   Abdomen:  No masses or tenderness  Liver: No Abnormalities Noted  Musculoskeletal/Skin:  Exhibits normal gait balance and coordination  There is no clubbing, cyanosis of the extremities  Skin is warm and dry  Moves all extremities well  Neurological/Psychiatric:  Alert and oriented in all spheres  No abnormalities of mood, affect, mentation, or behavior are noted    Lab Data reviewed and analyzed   CBC:   WBC   Date Value Ref Range Status   01/07/2024 7.9 4.0 - 11.0 K/uL Final   01/06/2024 8.7 4.0 - 11.0 K/uL Final   01/05/2024 8.2 4.0 - 11.0 K/uL Final     RBC   Date Value Ref Range Status   01/07/2024 4.53 4.00 - 5.20 M/uL Final   01/06/2024 4.32 4.00 - 5.20 M/uL Final   01/05/2024 4.69 4.00 - 5.20 M/uL Final     Hemoglobin   Date Value Ref Range Status   01/07/2024 14.3 12.0 - 16.0 g/dL Final   01/06/2024 13.9 12.0 - 16.0 g/dL Final   01/05/2024 14.7 12.0 - 16.0 g/dL Final     Hematocrit   Date Value Ref Range Status   01/07/2024 42.0 36.0 - 48.0 % Final   01/06/2024 40.1 36.0 - 48.0 % Final   01/05/2024 43.0 36.0 - 48.0 % Final     MCV   Date Value Ref Range Status   01/07/2024 92.9 80.0 - 100.0 fL Final   01/06/2024 92.9 80.0 - 100.0 fL Final   01/05/2024 91.7 80.0 - 100.0 fL Final     RDW   Date Value Ref Range Status   01/07/2024 13.4 12.4 - 15.4 % Final   01/06/2024 13.3 12.4 - 15.4 % Final   01/05/2024 13.4 12.4 - 15.4 % Final     Platelets   Date Value Ref Range Status   01/07/2024 245 135 - 450 K/uL Final   01/06/2024 233 135 - 450 K/uL Final   01/05/2024 255 135 - 450 K/uL Final     Iron:  No results found for: \"IRON\", \"TIBC\", \"FERRITIN\"  BMP:   Sodium   Date Value Ref Range Status   03/26/2024 142 136 - 145 mmol/L Final   01/07/2024 141 136 - 145 mmol/L Final   01/06/2024 138 136 - 145 mmol/L Final     Potassium   Date Value Ref Range Status   03/26/2024 4.0 3.5 - 5.1 mmol/L Final     Potassium reflex Magnesium   Date Value Ref Range Status   01/07/2024 4.0 3.5 - 5.1 mmol/L Final

## 2024-08-14 ENCOUNTER — OFFICE VISIT (OUTPATIENT)
Dept: CARDIOLOGY CLINIC | Age: 44
End: 2024-08-14
Payer: COMMERCIAL

## 2024-08-14 VITALS
BODY MASS INDEX: 31.98 KG/M2 | HEIGHT: 68 IN | OXYGEN SATURATION: 97 % | HEART RATE: 73 BPM | DIASTOLIC BLOOD PRESSURE: 60 MMHG | SYSTOLIC BLOOD PRESSURE: 90 MMHG | WEIGHT: 211 LBS

## 2024-08-14 DIAGNOSIS — I25.42 DISSECTION OF CORONARY ARTERY: ICD-10-CM

## 2024-08-14 DIAGNOSIS — I25.5 ISCHEMIC CARDIOMYOPATHY: Primary | ICD-10-CM

## 2024-08-14 PROBLEM — R07.9 CHEST PAIN WITH HIGH RISK OF ACUTE CORONARY SYNDROME: Status: RESOLVED | Noted: 2024-01-05 | Resolved: 2024-08-14

## 2024-08-14 PROBLEM — Z98.890 STATUS POST CARDIAC CATHETERIZATION: Status: RESOLVED | Noted: 2024-01-05 | Resolved: 2024-08-14

## 2024-08-14 PROCEDURE — 99214 OFFICE O/P EST MOD 30 MIN: CPT | Performed by: NURSE PRACTITIONER

## 2024-08-14 NOTE — PATIENT INSTRUCTIONS
Entresto 24/26mg take 1 tab twice a day  Continue Toprol   Continue aspirin   Recommend CTA head/neck chest/abd/pelvis given spontaneous artery dissection  Call to schedule the CT scan [phone] - once financially able to    Follow up in Jan with Dr. Grsisom

## 2024-10-29 DIAGNOSIS — I25.42 CORONARY ARTERY DISSECTION: ICD-10-CM

## 2024-10-29 DIAGNOSIS — I21.3 ACUTE ST ELEVATION MYOCARDIAL INFARCTION (STEMI), UNSPECIFIED ARTERY (HCC): ICD-10-CM

## 2024-10-29 RX ORDER — ASPIRIN 81 MG/1
TABLET, CHEWABLE ORAL
Qty: 90 TABLET | Refills: 2 | Status: SHIPPED | OUTPATIENT
Start: 2024-10-29

## 2024-11-21 DIAGNOSIS — I25.42 CORONARY ARTERY DISSECTION: ICD-10-CM

## 2024-11-21 DIAGNOSIS — I25.5 ISCHEMIC CARDIOMYOPATHY: ICD-10-CM

## 2024-11-21 RX ORDER — SACUBITRIL AND VALSARTAN 24; 26 MG/1; MG/1
1 TABLET, FILM COATED ORAL 2 TIMES DAILY
Qty: 60 TABLET | Refills: 2 | Status: SHIPPED | OUTPATIENT
Start: 2024-11-21

## 2024-11-21 NOTE — TELEPHONE ENCOUNTER
Last Office Visit: 8/14/2024 Provider: ELIZ  Is provider OOT? No    Next Office Visit: 1/30/2025 Provider: MONICA  **Has patient already had 30 day supply? No    LAST LABS:   BMP:   Lab Results   Component Value Date/Time     03/26/2024 12:24 PM    K 4.0 03/26/2024 12:24 PM    K 4.0 01/07/2024 04:40 AM     03/26/2024 12:24 PM    CO2 25 03/26/2024 12:24 PM    BUN 19 03/26/2024 12:24 PM    CREATININE 0.8 03/26/2024 12:24 PM    GLUCOSE 91 03/26/2024 12:24 PM    CALCIUM 9.0 03/26/2024 12:24 PM    LABGLOM >90 03/26/2024 12:24 PM         Requested Prescriptions     Pending Prescriptions Disp Refills    ENTRESTO 24-26 MG per tablet [Pharmacy Med Name: ENTRESTO 24 MG-26 MG TABLET] 60 tablet 3     Sig: TAKE 1 TABLET BY MOUTH TWICE A DAY

## 2024-12-30 ENCOUNTER — TELEPHONE (OUTPATIENT)
Dept: CARDIOLOGY CLINIC | Age: 44
End: 2024-12-30

## 2024-12-30 NOTE — TELEPHONE ENCOUNTER
Spoke with pts  per HIPAA, he sts they need samples through Mu d/t co-pay card being renewed. Relayed I would contact cler staff to see if they have samples. He v/u

## 2024-12-30 NOTE — TELEPHONE ENCOUNTER
Cler staff has samples for pt, called pts , he v/u. He requested I send phone number and address in [employer] msg. Msg sent.

## 2024-12-30 NOTE — TELEPHONE ENCOUNTER
Pt called and stated that she ran out of ENTRESTO 24-26 MG per tablet on Friday 12/27.  Pt stated that she can not afford to pay for this and would like someone to contact her and advise what next steps would be to help.

## 2025-01-27 DIAGNOSIS — E78.5 HYPERLIPIDEMIA, UNSPECIFIED: ICD-10-CM

## 2025-01-27 RX ORDER — ATORVASTATIN CALCIUM 40 MG/1
40 TABLET, FILM COATED ORAL NIGHTLY
Qty: 90 TABLET | Refills: 0 | Status: SHIPPED | OUTPATIENT
Start: 2025-01-27 | End: 2025-01-30 | Stop reason: SDUPTHER

## 2025-01-30 ENCOUNTER — OFFICE VISIT (OUTPATIENT)
Dept: CARDIOLOGY CLINIC | Age: 45
End: 2025-01-30
Payer: COMMERCIAL

## 2025-01-30 VITALS
SYSTOLIC BLOOD PRESSURE: 116 MMHG | OXYGEN SATURATION: 100 % | BODY MASS INDEX: 31.37 KG/M2 | DIASTOLIC BLOOD PRESSURE: 58 MMHG | HEIGHT: 68 IN | WEIGHT: 207 LBS | HEART RATE: 67 BPM

## 2025-01-30 DIAGNOSIS — I25.42 DISSECTION OF CORONARY ARTERY: ICD-10-CM

## 2025-01-30 DIAGNOSIS — I25.5 ISCHEMIC CARDIOMYOPATHY: ICD-10-CM

## 2025-01-30 DIAGNOSIS — E78.2 MIXED HYPERLIPIDEMIA: ICD-10-CM

## 2025-01-30 DIAGNOSIS — I50.22 SYSTOLIC CHF, CHRONIC (HCC): ICD-10-CM

## 2025-01-30 DIAGNOSIS — I25.42 CORONARY ARTERY DISSECTION: Primary | ICD-10-CM

## 2025-01-30 DIAGNOSIS — I42.9 CARDIOMYOPATHY, UNSPECIFIED TYPE (HCC): ICD-10-CM

## 2025-01-30 PROCEDURE — 99214 OFFICE O/P EST MOD 30 MIN: CPT | Performed by: INTERNAL MEDICINE

## 2025-01-30 PROCEDURE — 93000 ELECTROCARDIOGRAM COMPLETE: CPT | Performed by: INTERNAL MEDICINE

## 2025-01-30 RX ORDER — VALSARTAN 40 MG/1
40 TABLET ORAL DAILY
Qty: 90 TABLET | Refills: 3 | Status: SHIPPED | OUTPATIENT
Start: 2025-01-30

## 2025-01-30 RX ORDER — ATORVASTATIN CALCIUM 40 MG/1
40 TABLET, FILM COATED ORAL NIGHTLY
Qty: 90 TABLET | Refills: 3 | Status: SHIPPED | OUTPATIENT
Start: 2025-01-30

## 2025-01-30 NOTE — PROGRESS NOTES
/    [facility]   Cardiovascular Evaluation    PATIENT: Norma Dave  DATE: 2025  MRN: [id number]  CSN: [id number]  : 1980    Primary Care Doctor/Referring provider: Pedrito Hansen DO, No admitting provider for patient encounter.     Reason for evaluation/Chief complaint:   No chief complaint on file.      Subjective:    History of present illness on initial date of evaluation:   Norma Dave is a 44 y.o. female patient who presents for cardiology hospital follow up. She has a medical history significant of family history of early CAD, congenital deafness, STEMI, NSVT,  s/p spontaneous coronary artery dissection, and ischemic cardiomyopathy. She was admitted to [facility] 19-19 presenting with complaints of chest pain. Troponin elevated. EKG STEMI, ST elevation noted. She therefore underwent emergent LHC with  2019 revealing spontaneous coronary artery dissection of mid to apical LAD. Transferred to ICU. Echo 19 EF 35-40%, severe hypokinesias of anterior apical walls consistent with infarction of LAD, Grade I DD, mild MR. She had followed with Dr.Jason Guzman for subsequent follow up.    She presented to [facility] and was admitted 24-24 arriving with complaints of chest pain. She was airlifted for emergent left heart cath in relation to STEMI. She underwent left heart catheterization 24 that revealed spontaneous coronary dissection involving LAD and diagonal, she was admitted to ICU with antiplatelet and anticoagulation therapy. Echo 24 EF 30-35%, hypokinesis of apex, apical lateral, apical septum anterior and inferolateral walls, and mild MR.    Since last office visit she had an Echo 2024 EF 50-55%, mild apical hypokinesis, and trace RE, trace MR. She followed with Gertrude Morin CNP 24 with order for CTA head/neck given history of coronary artery dissection. These are not 
auscultation bilaterally, respirations unlabored   Chest Wall:  No tenderness or deformity   Heart:  Regular rhythm and normal rate; S1, S2 are normal; no murmur noted; no rub or gallop   Abdomen:   Soft, non-tender, bowel sounds active all four quadrants,  no masses, no organomegaly           Extremities: Extremities normal, atraumatic, no cyanosis or edema   Pulses: Right cath sie with some ecchymosis. Right Radial pulse is not palpable.   Skin: Skin color, texture, turgor normal, no rashes or lesions   Pysch: Normal mood and affect   Neurologic: Normal gross motor and sensory exam.         Labs  No results for input(s): \"WBC\", \"HGB\", \"HCT\", \"MCV\", \"PLT\" in the last 72 hours.  No results for input(s): \"CREATININE\", \"BUN\", \"NA\", \"K\", \"CL\", \"CO2\" in the last 72 hours.  No results for input(s): \"INR\", \"PROTIME\" in the last 72 hours.  No results for input(s): \"PROBNP\" in the last 72 hours.  No results for input(s): \"CHOL\", \"LDL\", \"HDL\" in the last 72 hours.    Invalid input(s): \"TG\"  Lab Results   Component Value Date    TROPHS 14 01/05/2024          Imaging:  I have reviewed the below testing personally and my interpretation is below.  EKG:        CXR:      Assessment:  44 y.o. patient with:  Active Problems:    * No active hospital problems. *  Resolved Problems:    * No resolved hospital problems. *      Problem List Items Addressed This Visit       Ischemic cardiomyopathy    Relevant Medications    atorvastatin (LIPITOR) 40 MG tablet    valsartan (DIOVAN) 40 MG tablet    Other Relevant Orders    Echo (TTE) complete (PRN contrast/bubble/strain/3D)    Dissection of coronary artery    Relevant Medications    atorvastatin (LIPITOR) 40 MG tablet    valsartan (DIOVAN) 40 MG tablet    Other Relevant Orders    Echo (TTE) complete (PRN contrast/bubble/strain/3D)    Systolic CHF, chronic (HCC)    Relevant Medications    atorvastatin (LIPITOR) 40 MG tablet    valsartan (DIOVAN) 40 MG tablet    Other Relevant Orders    Echo

## 2025-01-30 NOTE — PATIENT INSTRUCTIONS
Your provider has ordered testing for further evaluation.  An order/prescription has been included in your paper work.   To schedule outpatient testing, contact Central Scheduling by calling [facility] ([phone]).      PLAN:  Stop entresto due to cost  Start valsartan 40 mg daily  Echocardiogram in March 2025 to assess heart function and valves  Follow up with Gertrude Morin in 4-6 months

## 2025-06-03 ENCOUNTER — OFFICE VISIT (OUTPATIENT)
Dept: CARDIOLOGY CLINIC | Age: 45
End: 2025-06-03
Payer: COMMERCIAL

## 2025-06-03 VITALS
DIASTOLIC BLOOD PRESSURE: 68 MMHG | WEIGHT: 215 LBS | OXYGEN SATURATION: 96 % | HEIGHT: 67 IN | HEART RATE: 95 BPM | BODY MASS INDEX: 33.74 KG/M2 | SYSTOLIC BLOOD PRESSURE: 106 MMHG

## 2025-06-03 DIAGNOSIS — R60.9 SWELLING: ICD-10-CM

## 2025-06-03 DIAGNOSIS — E55.9 VITAMIN D DEFICIENCY: ICD-10-CM

## 2025-06-03 DIAGNOSIS — R53.83 OTHER FATIGUE: ICD-10-CM

## 2025-06-03 DIAGNOSIS — I50.22 SYSTOLIC CHF, CHRONIC (HCC): Primary | ICD-10-CM

## 2025-06-03 DIAGNOSIS — Z79.899 MEDICATION MANAGEMENT: ICD-10-CM

## 2025-06-03 PROCEDURE — G2211 COMPLEX E/M VISIT ADD ON: HCPCS | Performed by: NURSE PRACTITIONER

## 2025-06-03 PROCEDURE — 99214 OFFICE O/P EST MOD 30 MIN: CPT | Performed by: NURSE PRACTITIONER

## 2025-06-03 NOTE — PROGRESS NOTES
systolic function is low normal with a visually estimated   ejection fraction of 50-55%. EF calculated by Greenberg's method at 54%.   The left ventricle is normal in size with normal wall thickness. Apical   hypokinesis.   Normal left ventricular diastolic function.   No significant valvular disease.    Echo January 2024  Summary   The left ventricular systolic function is moderately reduced with an   ejection fraction of 30 - 35 %.   There is hypokinesis of the apex, apical lateral, apical septum anterior and   inferolateral walls.   Normal left ventricular size with moderate concentric left ventricular   hypertrophy.   Grade I diastolic dysfunction with normal filling pressure.   Changes noted from previous echo on 7- in left ventricular function.   Mild mitral regurgitation.   No tricuspid regurgitation to estimate systolic pulmonary artery pressure   (SPAP).    Cardiac cath 1/5/2024  Procedures Performed:   1. Left heart catheterization  2. Selective left and right coronary angiogram  3. Left ventriculography   4. IVUS of the LM/LAD     Procedure Findings:  1.  Coronary artery dissection of the proximal left anterior descending artery and first diagonal branch.  2.  Reduced left ventricular function with evidence of anterior hypokinesis and ejection fraction of 40%  3. Normal left heart hemodynamic    Echo 3/2024  Summary   Normal left ventricle systolic function with an estimated ejection fraction   of 50-55%. There is mild apical hypokinesis.   Normal left ventricular diastolic filling pressure.   Trace mitral and tricuspid regurgitation.   Systolic pulmonary artery pressure (SPAP) is normal and estimated at 22 mmHg   (right atrial pressure 3 mmHg).    NYHA:                        Class II:  Slight limitation of physical activity.  ACC/ AHA Stage:      Stage B:  Structural heart disease but without signs or symptoms of HF    Assessment:  HFrEF improved >40%  Hx of STEMI due to spontaneous coronary artery

## 2025-06-03 NOTE — PATIENT INSTRUCTIONS
Continue Toprol   Continue Valsartan  Recommend compression socks for the leg swelling  Check labs   Continue aspirin   Can consider sleep referral to evaluate for sleep apnea   Follow up with Dr. Grissom in 6 months

## 2025-06-06 ENCOUNTER — PROCEDURE VISIT (OUTPATIENT)
Dept: AUDIOLOGY | Age: 45
End: 2025-06-06

## 2025-06-06 DIAGNOSIS — H90.3 SENSORINEURAL HEARING LOSS, BILATERAL: Primary | ICD-10-CM

## 2025-06-06 NOTE — PROGRESS NOTES
Norma JW Dave  1980.45 y.o. female   [id number]    HEARING AID DROP OFF    RIGHT EAR: /MODEL: Phonak Audeo M70-R  SN: [id number]  EARMOLD/TUBING/WIRE/DOME: 2xM with small power domes    LEFT EAR: /MODEL: Phonak Audeo M70-R  SN: [id number]  EARMOLD/TUBING/WIRE/DOME: 2xM with small power domes    HAF: 12/19/2019  HA WARRANTY: 03/12/2022 (2 years)- (RIGHT WARRANTY- 8/21/2024)  TV CONNECTOR: [id number]  TV CONNECTOR WARRANTY: 03/12/2021     BUTTONS: Volume control   PROGRAMS: [employer]  PHONE CONNECTIVITY: Aids paired to patient's phone.     PROCEDURES:     Patient dropped off right hearing aid due to broken  wire.  She has a 4.0  which was discontinued by the .  Called patient's  and explained a 5.0  would be used to replace.  Advised having the same  wire on both hearing aids is beneficial for cleaning management.  Patient is scheduled with Dr. Tamie Sadler to replace both receivers.  Advised she will be charged $115.00 (out of warranty right) at the appointment for  replacement however both receivers will be replaced.     **Right hearing aid is in the Oli repair drawer which is located underneath hearing aid dryer.  Hearing is in a plastic bag and stapled to drop off form.       RECOMMENDATIONS:     Continue consistent hearing aid use  Return for hearing aid checks as needed  Retest hearing as medically indicated and/or sooner if a change in hearing is noted.  Contact audiologist with questions/concerns as needed    **No charge for today's appointment    Lamar Garcia  Audiologist

## 2025-06-09 NOTE — PROGRESS NOTES
Norma Dave  1980.45 y.o. female   [id number]    HEARING AID CHECK    RIGHT EAR: /MODEL: Phonak Audeo M70-R  SN: [id number]  EARMOLD/TUBING/WIRE/DOME: 2M 5.0 with small power domes    LEFT EAR: /MODEL: Phonak Audeo M70-R  SN: [id number]  EARMOLD/TUBING/WIRE/DOME: 2M 5.0 with small power domes    HAF: 12/19/2019  HA WARRANTY: 03/12/2022 (2 years)- (RIGHT WARRANTY- 8/21/2024)  TV CONNECTOR: SN- [id number]  TV CONNECTOR WARRANTY: 03/12/2021     BUTTONS: Volume control   PROGRAMS: [employer]  PHONE CONNECTIVITY: Aids paired to patient's phone.      Norma Dave was seen today, 6/11/2025, for a hearing aid check appointment.    HEARING AID SERVICING:     Patient dropped off the Right hearing aid on 6/6/25 with a broken  wire (2M 4.0). As documented in Dr. Roselyn Salazar's note from that date, it is recommended to switch to a 2M 5.0  on both devices.     At today's visit, both the Right and Left hearing aid wires were successfully switched from a 2M 4.0 to 2M 5.0 receivers.      Reviewed how to change CeruStop wax filters with the patient. Patient reported understanding with changing domes/filters. Provided patient with extra wax filters.     PATIENT EDUCATION:     - Verbally and visually reviewed importance of consistent use and care and maintenance of devices.      Information was verbally shared with patient during appointment.        RECOMMENDATIONS:     Continue consistent hearing aid use  Return for a hearing aid check in 6-12 months or sooner as needed. Patient prefers to contact us as-needed.   Retest hearing as medically indicated and/or sooner if a change in hearing is noted.  Contact audiologist with questions/concerns as needed      **Patient paid $115 for today's appointment. DO NOT BILL INSURANCE    Lamar Randall  Audiologist

## 2025-06-11 ENCOUNTER — PROCEDURE VISIT (OUTPATIENT)
Dept: AUDIOLOGY | Age: 45
End: 2025-06-11

## 2025-06-11 DIAGNOSIS — H90.3 SENSORINEURAL HEARING LOSS, BILATERAL: Primary | ICD-10-CM

## 2025-06-11 PROCEDURE — V5267 HEARING AID SUP/ACCESS/DEV: HCPCS

## 2025-07-23 DIAGNOSIS — I50.22 SYSTOLIC CHF, CHRONIC (HCC): ICD-10-CM

## 2025-07-23 RX ORDER — METOPROLOL SUCCINATE 25 MG/1
25 TABLET, EXTENDED RELEASE ORAL DAILY
Qty: 90 TABLET | Refills: 1 | Status: SHIPPED | OUTPATIENT
Start: 2025-07-23

## 2025-07-27 DIAGNOSIS — I25.42 CORONARY ARTERY DISSECTION: ICD-10-CM

## 2025-07-27 DIAGNOSIS — I21.3 ACUTE ST ELEVATION MYOCARDIAL INFARCTION (STEMI), UNSPECIFIED ARTERY (HCC): ICD-10-CM

## 2025-07-28 RX ORDER — ASPIRIN 81 MG
81 TABLET,CHEWABLE ORAL DAILY
Qty: 90 TABLET | Refills: 3 | Status: SHIPPED | OUTPATIENT
Start: 2025-07-28

## 2025-07-28 NOTE — TELEPHONE ENCOUNTER
Last Office Visit: 6/3/2025 Provider: ELIZ  **Is provider OOT? No    Next Office Visit: 12/2/2025 Provider: NII